# Patient Record
Sex: MALE | Race: OTHER | Employment: OTHER | URBAN - METROPOLITAN AREA
[De-identification: names, ages, dates, MRNs, and addresses within clinical notes are randomized per-mention and may not be internally consistent; named-entity substitution may affect disease eponyms.]

---

## 2018-01-01 ENCOUNTER — APPOINTMENT (OUTPATIENT)
Dept: GENERAL RADIOLOGY | Age: 73
DRG: 308 | End: 2018-01-01
Attending: INTERNAL MEDICINE
Payer: MEDICARE

## 2018-01-01 ENCOUNTER — APPOINTMENT (OUTPATIENT)
Dept: GENERAL RADIOLOGY | Age: 73
DRG: 308 | End: 2018-01-01
Attending: EMERGENCY MEDICINE
Payer: MEDICARE

## 2018-01-01 ENCOUNTER — HOSPITAL ENCOUNTER (INPATIENT)
Age: 73
LOS: 1 days | DRG: 308 | End: 2018-09-19
Attending: EMERGENCY MEDICINE | Admitting: INTERNAL MEDICINE
Payer: MEDICARE

## 2018-01-01 ENCOUNTER — APPOINTMENT (OUTPATIENT)
Dept: ULTRASOUND IMAGING | Age: 73
DRG: 308 | End: 2018-01-01
Attending: INTERNAL MEDICINE
Payer: MEDICARE

## 2018-01-01 VITALS
BODY MASS INDEX: 24.31 KG/M2 | RESPIRATION RATE: 12 BRPM | SYSTOLIC BLOOD PRESSURE: 56 MMHG | HEART RATE: 100 BPM | DIASTOLIC BLOOD PRESSURE: 23 MMHG | OXYGEN SATURATION: 89 % | HEIGHT: 72 IN | TEMPERATURE: 97.7 F | WEIGHT: 179.45 LBS

## 2018-01-01 DIAGNOSIS — N17.9 ACUTE RENAL FAILURE, UNSPECIFIED ACUTE RENAL FAILURE TYPE (HCC): ICD-10-CM

## 2018-01-01 DIAGNOSIS — E87.6 HYPOKALEMIA: ICD-10-CM

## 2018-01-01 DIAGNOSIS — I46.9 CARDIAC ARREST (HCC): Primary | ICD-10-CM

## 2018-01-01 DIAGNOSIS — I47.20 VENTRICULAR TACHYCARDIA: ICD-10-CM

## 2018-01-01 DIAGNOSIS — E83.51 HYPOCALCEMIA: ICD-10-CM

## 2018-01-01 LAB
ALBUMIN SERPL-MCNC: 3.4 G/DL (ref 3.5–5)
ALBUMIN SERPL-MCNC: 3.5 G/DL (ref 3.5–5)
ALBUMIN/GLOB SERPL: 1.3 {RATIO} (ref 1.1–2.2)
ALBUMIN/GLOB SERPL: 1.3 {RATIO} (ref 1.1–2.2)
ALP SERPL-CCNC: 86 U/L (ref 45–117)
ALP SERPL-CCNC: 93 U/L (ref 45–117)
ALT SERPL-CCNC: 23 U/L (ref 12–78)
ALT SERPL-CCNC: 3048 U/L (ref 12–78)
ANION GAP BLD CALC-SCNC: 19 MMOL/L (ref 10–20)
ANION GAP SERPL CALC-SCNC: 14 MMOL/L (ref 5–15)
ANION GAP SERPL CALC-SCNC: 14 MMOL/L (ref 5–15)
ANION GAP SERPL CALC-SCNC: 15 MMOL/L (ref 5–15)
APTT PPP: 25.2 SEC (ref 22.1–32)
ARTERIAL PATENCY WRIST A: YES
AST SERPL-CCNC: 19 U/L (ref 15–37)
AST SERPL-CCNC: >2000 U/L (ref 15–37)
ATRIAL RATE: 110 BPM
ATRIAL RATE: 78 BPM
BASE DEFICIT BLDA-SCNC: 1.1 MMOL/L
BASE DEFICIT BLDA-SCNC: 6.7 MMOL/L
BASE DEFICIT BLDA-SCNC: 7.2 MMOL/L
BASE DEFICIT BLDA-SCNC: 7.6 MMOL/L
BASOPHILS # BLD: 0 K/UL (ref 0–0.1)
BASOPHILS # BLD: 0 K/UL (ref 0–0.1)
BASOPHILS NFR BLD: 0 % (ref 0–1)
BASOPHILS NFR BLD: 0 % (ref 0–1)
BDY SITE: ABNORMAL
BILIRUB SERPL-MCNC: 1.4 MG/DL (ref 0.2–1)
BILIRUB SERPL-MCNC: 3.5 MG/DL (ref 0.2–1)
BREATHS.SPONTANEOUS ON VENT: 16
BREATHS.SPONTANEOUS ON VENT: 24
BUN BLD-MCNC: 58 MG/DL (ref 9–20)
BUN SERPL-MCNC: 64 MG/DL (ref 6–20)
BUN SERPL-MCNC: 67 MG/DL (ref 6–20)
BUN SERPL-MCNC: 70 MG/DL (ref 6–20)
BUN/CREAT SERPL: 22 (ref 12–20)
BUN/CREAT SERPL: 23 (ref 12–20)
BUN/CREAT SERPL: 26 (ref 12–20)
CA-I BLD-MCNC: 1.05 MMOL/L (ref 1.12–1.32)
CALCIUM SERPL-MCNC: 7.5 MG/DL (ref 8.5–10.1)
CALCIUM SERPL-MCNC: 8.5 MG/DL (ref 8.5–10.1)
CALCIUM SERPL-MCNC: 8.7 MG/DL (ref 8.5–10.1)
CALCULATED P AXIS, ECG09: 94 DEGREES
CALCULATED R AXIS, ECG10: -27 DEGREES
CALCULATED R AXIS, ECG10: 32 DEGREES
CALCULATED T AXIS, ECG11: 116 DEGREES
CALCULATED T AXIS, ECG11: 123 DEGREES
CHLORIDE BLD-SCNC: 98 MMOL/L (ref 98–107)
CHLORIDE SERPL-SCNC: 101 MMOL/L (ref 97–108)
CHLORIDE SERPL-SCNC: 101 MMOL/L (ref 97–108)
CHLORIDE SERPL-SCNC: 103 MMOL/L (ref 97–108)
CK MB CFR SERPL CALC: 1.1 % (ref 0–2.5)
CK MB CFR SERPL CALC: 2.3 % (ref 0–2.5)
CK MB CFR SERPL CALC: 3.4 % (ref 0–2.5)
CK MB SERPL-MCNC: 1.2 NG/ML (ref 5–25)
CK MB SERPL-MCNC: 1.3 NG/ML (ref 5–25)
CK MB SERPL-MCNC: 2 NG/ML (ref 5–25)
CK SERPL-CCNC: 107 U/L (ref 39–308)
CK SERPL-CCNC: 57 U/L (ref 39–308)
CK SERPL-CCNC: 59 U/L (ref 39–308)
CO2 BLD-SCNC: 25 MMOL/L (ref 21–32)
CO2 SERPL-SCNC: 21 MMOL/L (ref 21–32)
CO2 SERPL-SCNC: 21 MMOL/L (ref 21–32)
CO2 SERPL-SCNC: 24 MMOL/L (ref 21–32)
CREAT BLD-MCNC: 2.2 MG/DL (ref 0.6–1.3)
CREAT SERPL-MCNC: 2.43 MG/DL (ref 0.7–1.3)
CREAT SERPL-MCNC: 2.94 MG/DL (ref 0.7–1.3)
CREAT SERPL-MCNC: 3.24 MG/DL (ref 0.7–1.3)
DIAGNOSIS, 93000: NORMAL
DIAGNOSIS, 93000: NORMAL
DIFFERENTIAL METHOD BLD: ABNORMAL
DIFFERENTIAL METHOD BLD: ABNORMAL
EOSINOPHIL # BLD: 0 K/UL (ref 0–0.4)
EOSINOPHIL # BLD: 0.1 K/UL (ref 0–0.4)
EOSINOPHIL NFR BLD: 0 % (ref 0–7)
EOSINOPHIL NFR BLD: 3 % (ref 0–7)
EPAP/CPAP/PEEP, PAPEEP: 6
EPAP/CPAP/PEEP, PAPEEP: 7
ERYTHROCYTE [DISTWIDTH] IN BLOOD BY AUTOMATED COUNT: 16.5 % (ref 11.5–14.5)
ERYTHROCYTE [DISTWIDTH] IN BLOOD BY AUTOMATED COUNT: 16.5 % (ref 11.5–14.5)
FIO2 ON VENT: 44 %
FIO2 ON VENT: 50 %
FIO2 ON VENT: 60 %
FOLATE SERPL-MCNC: 48.1 NG/ML (ref 5–21)
GAS FLOW.O2 O2 DELIVERY SYS: 4 L/MIN
GAS FLOW.O2 O2 DELIVERY SYS: 6 L/MIN
GAS FLOW.O2 SETTING OXYMISER: 4 L/MIN
GAS FLOW.O2 SETTING OXYMISER: 4 L/MIN
GLOBULIN SER CALC-MCNC: 2.7 G/DL (ref 2–4)
GLOBULIN SER CALC-MCNC: 2.8 G/DL (ref 2–4)
GLUCOSE BLD-MCNC: 194 MG/DL (ref 65–100)
GLUCOSE SERPL-MCNC: 142 MG/DL (ref 65–100)
GLUCOSE SERPL-MCNC: 144 MG/DL (ref 65–100)
GLUCOSE SERPL-MCNC: 180 MG/DL (ref 65–100)
HAPTOGLOB SERPL-MCNC: 68 MG/DL (ref 30–200)
HCO3 BLDA-SCNC: 16 MMOL/L (ref 22–26)
HCO3 BLDA-SCNC: 17 MMOL/L (ref 22–26)
HCO3 BLDA-SCNC: 19 MMOL/L (ref 22–26)
HCO3 BLDA-SCNC: 24 MMOL/L (ref 22–26)
HCT VFR BLD AUTO: 39.3 % (ref 36.6–50.3)
HCT VFR BLD AUTO: 40.4 % (ref 36.6–50.3)
HCT VFR BLD CALC: 41 % (ref 36.6–50.3)
HGB BLD-MCNC: 12.6 G/DL (ref 12.1–17)
HGB BLD-MCNC: 13 G/DL (ref 12.1–17)
IMM GRANULOCYTES # BLD: 0 K/UL (ref 0–0.04)
IMM GRANULOCYTES # BLD: 0 K/UL (ref 0–0.04)
IMM GRANULOCYTES NFR BLD AUTO: 0 % (ref 0–0.5)
IMM GRANULOCYTES NFR BLD AUTO: 0 % (ref 0–0.5)
INR PPP: 1.3 (ref 0.9–1.1)
IPAP/PIP, IPAPIP: 12
IPAP/PIP, IPAPIP: 14
LDH SERPL L TO P-CCNC: >4000 U/L (ref 85–241)
LYMPHOCYTES # BLD: 0.2 K/UL (ref 0.8–3.5)
LYMPHOCYTES # BLD: 0.5 K/UL (ref 0.8–3.5)
LYMPHOCYTES NFR BLD: 1 % (ref 12–49)
LYMPHOCYTES NFR BLD: 17 % (ref 12–49)
MAGNESIUM SERPL-MCNC: 2.4 MG/DL (ref 1.6–2.4)
MAGNESIUM SERPL-MCNC: 2.4 MG/DL (ref 1.6–2.4)
MCH RBC QN AUTO: 34.4 PG (ref 26–34)
MCH RBC QN AUTO: 34.6 PG (ref 26–34)
MCHC RBC AUTO-ENTMCNC: 32.1 G/DL (ref 30–36.5)
MCHC RBC AUTO-ENTMCNC: 32.2 G/DL (ref 30–36.5)
MCV RBC AUTO: 106.9 FL (ref 80–99)
MCV RBC AUTO: 108 FL (ref 80–99)
METAMYELOCYTES NFR BLD MANUAL: 1 %
MONOCYTES # BLD: 0.3 K/UL (ref 0–1)
MONOCYTES # BLD: 0.4 K/UL (ref 0–1)
MONOCYTES NFR BLD: 15 % (ref 5–13)
MONOCYTES NFR BLD: 2 % (ref 5–13)
NEUTS BAND NFR BLD MANUAL: 4 %
NEUTS BAND NFR BLD MANUAL: 8 %
NEUTS SEG # BLD: 1.7 K/UL (ref 1.8–8)
NEUTS SEG # BLD: 15 K/UL (ref 1.8–8)
NEUTS SEG NFR BLD: 61 % (ref 32–75)
NEUTS SEG NFR BLD: 88 % (ref 32–75)
NRBC # BLD: 0.03 K/UL (ref 0–0.01)
NRBC # BLD: 0.06 K/UL (ref 0–0.01)
NRBC BLD-RTO: 0.4 PER 100 WBC
NRBC BLD-RTO: 1.1 PER 100 WBC
P-R INTERVAL, ECG05: 122 MS
PCO2 BLDA: 29 MMHG (ref 35–45)
PCO2 BLDA: 31 MMHG (ref 35–45)
PCO2 BLDA: 39 MMHG (ref 35–45)
PCO2 BLDA: 40 MMHG (ref 35–45)
PH BLDA: 7.3 [PH] (ref 7.35–7.45)
PH BLDA: 7.36 [PH] (ref 7.35–7.45)
PH BLDA: 7.37 [PH] (ref 7.35–7.45)
PH BLDA: 7.39 [PH] (ref 7.35–7.45)
PHOSPHATE SERPL-MCNC: 4.1 MG/DL (ref 2.6–4.7)
PLATELET # BLD AUTO: 74 K/UL (ref 150–400)
PLATELET # BLD AUTO: 92 K/UL (ref 150–400)
PMV BLD AUTO: 11.3 FL (ref 8.9–12.9)
PMV BLD AUTO: 11.9 FL (ref 8.9–12.9)
PO2 BLDA: 39 MMHG (ref 80–100)
PO2 BLDA: 47 MMHG (ref 80–100)
PO2 BLDA: 50 MMHG (ref 80–100)
PO2 BLDA: 82 MMHG (ref 80–100)
POTASSIUM BLD-SCNC: 2.6 MMOL/L (ref 3.5–5.1)
POTASSIUM SERPL-SCNC: 2.7 MMOL/L (ref 3.5–5.1)
POTASSIUM SERPL-SCNC: 4.1 MMOL/L (ref 3.5–5.1)
POTASSIUM SERPL-SCNC: 4.3 MMOL/L (ref 3.5–5.1)
PROT SERPL-MCNC: 6.1 G/DL (ref 6.4–8.2)
PROT SERPL-MCNC: 6.3 G/DL (ref 6.4–8.2)
PROTHROMBIN TIME: 13 SEC (ref 9–11.1)
Q-T INTERVAL, ECG07: 396 MS
Q-T INTERVAL, ECG07: 456 MS
QRS DURATION, ECG06: 154 MS
QRS DURATION, ECG06: 176 MS
QTC CALCULATION (BEZET), ECG08: 523 MS
QTC CALCULATION (BEZET), ECG08: 594 MS
RBC # BLD AUTO: 3.64 M/UL (ref 4.1–5.7)
RBC # BLD AUTO: 3.78 M/UL (ref 4.1–5.7)
RBC MORPH BLD: ABNORMAL
RETICS # AUTO: 0.23 M/UL (ref 0.03–0.1)
RETICS/RBC NFR AUTO: 6 % (ref 0.7–2.1)
SAO2 % BLD: 68 % (ref 92–97)
SAO2 % BLD: 83 % (ref 92–97)
SAO2 % BLD: 85 % (ref 92–97)
SAO2 % BLD: 96 % (ref 92–97)
SAO2% DEVICE SAO2% SENSOR NAME: ABNORMAL
SERVICE CMNT-IMP: ABNORMAL
SODIUM BLD-SCNC: 138 MMOL/L (ref 136–145)
SODIUM SERPL-SCNC: 137 MMOL/L (ref 136–145)
SODIUM SERPL-SCNC: 138 MMOL/L (ref 136–145)
SODIUM SERPL-SCNC: 139 MMOL/L (ref 136–145)
SPECIMEN SITE: ABNORMAL
THERAPEUTIC RANGE,PTTT: NORMAL SECS (ref 58–77)
TROPONIN I SERPL-MCNC: 0.1 NG/ML
TROPONIN I SERPL-MCNC: 0.17 NG/ML
TROPONIN I SERPL-MCNC: <0.05 NG/ML
TSH SERPL DL<=0.05 MIU/L-ACNC: 1.43 UIU/ML (ref 0.36–3.74)
VENTILATION MODE VENT: ABNORMAL
VENTRICULAR RATE, ECG03: 102 BPM
VENTRICULAR RATE, ECG03: 105 BPM
VIT B12 SERPL-MCNC: >2000 PG/ML (ref 193–986)
WBC # BLD AUTO: 15.6 K/UL (ref 4.1–11.1)
WBC # BLD AUTO: 2.7 K/UL (ref 4.1–11.1)

## 2018-01-01 PROCEDURE — 96375 TX/PRO/DX INJ NEW DRUG ADDON: CPT

## 2018-01-01 PROCEDURE — 82803 BLOOD GASES ANY COMBINATION: CPT | Performed by: INTERNAL MEDICINE

## 2018-01-01 PROCEDURE — 74011636637 HC RX REV CODE- 636/637: Performed by: INTERNAL MEDICINE

## 2018-01-01 PROCEDURE — 85610 PROTHROMBIN TIME: CPT | Performed by: EMERGENCY MEDICINE

## 2018-01-01 PROCEDURE — 83010 ASSAY OF HAPTOGLOBIN QUANT: CPT | Performed by: INTERNAL MEDICINE

## 2018-01-01 PROCEDURE — 99285 EMERGENCY DEPT VISIT HI MDM: CPT

## 2018-01-01 PROCEDURE — C1751 CATH, INF, PER/CENT/MIDLINE: HCPCS

## 2018-01-01 PROCEDURE — 74011250636 HC RX REV CODE- 250/636: Performed by: INTERNAL MEDICINE

## 2018-01-01 PROCEDURE — 36415 COLL VENOUS BLD VENIPUNCTURE: CPT | Performed by: INTERNAL MEDICINE

## 2018-01-01 PROCEDURE — 84443 ASSAY THYROID STIM HORMONE: CPT | Performed by: INTERNAL MEDICINE

## 2018-01-01 PROCEDURE — 74011250637 HC RX REV CODE- 250/637: Performed by: EMERGENCY MEDICINE

## 2018-01-01 PROCEDURE — 93306 TTE W/DOPPLER COMPLETE: CPT

## 2018-01-01 PROCEDURE — 80047 BASIC METABLC PNL IONIZED CA: CPT

## 2018-01-01 PROCEDURE — 96376 TX/PRO/DX INJ SAME DRUG ADON: CPT

## 2018-01-01 PROCEDURE — 94660 CPAP INITIATION&MGMT: CPT

## 2018-01-01 PROCEDURE — 93005 ELECTROCARDIOGRAM TRACING: CPT

## 2018-01-01 PROCEDURE — 65620000000 HC RM CCU GENERAL

## 2018-01-01 PROCEDURE — 76450000000

## 2018-01-01 PROCEDURE — 0BH17EZ INSERTION OF ENDOTRACHEAL AIRWAY INTO TRACHEA, VIA NATURAL OR ARTIFICIAL OPENING: ICD-10-PCS | Performed by: ANESTHESIOLOGY

## 2018-01-01 PROCEDURE — 82550 ASSAY OF CK (CPK): CPT | Performed by: EMERGENCY MEDICINE

## 2018-01-01 PROCEDURE — 82607 VITAMIN B-12: CPT | Performed by: INTERNAL MEDICINE

## 2018-01-01 PROCEDURE — 71045 X-RAY EXAM CHEST 1 VIEW: CPT

## 2018-01-01 PROCEDURE — 74011000250 HC RX REV CODE- 250: Performed by: INTERNAL MEDICINE

## 2018-01-01 PROCEDURE — 77030018786 HC NDL GD F/USND BARD -B

## 2018-01-01 PROCEDURE — 74011250636 HC RX REV CODE- 250/636: Performed by: EMERGENCY MEDICINE

## 2018-01-01 PROCEDURE — 85025 COMPLETE CBC W/AUTO DIFF WBC: CPT | Performed by: EMERGENCY MEDICINE

## 2018-01-01 PROCEDURE — 84484 ASSAY OF TROPONIN QUANT: CPT | Performed by: INTERNAL MEDICINE

## 2018-01-01 PROCEDURE — 36600 WITHDRAWAL OF ARTERIAL BLOOD: CPT | Performed by: EMERGENCY MEDICINE

## 2018-01-01 PROCEDURE — 94640 AIRWAY INHALATION TREATMENT: CPT

## 2018-01-01 PROCEDURE — 02HV33Z INSERTION OF INFUSION DEVICE INTO SUPERIOR VENA CAVA, PERCUTANEOUS APPROACH: ICD-10-PCS | Performed by: EMERGENCY MEDICINE

## 2018-01-01 PROCEDURE — 5A09357 ASSISTANCE WITH RESPIRATORY VENTILATION, LESS THAN 24 CONSECUTIVE HOURS, CONTINUOUS POSITIVE AIRWAY PRESSURE: ICD-10-PCS | Performed by: INTERNAL MEDICINE

## 2018-01-01 PROCEDURE — 82550 ASSAY OF CK (CPK): CPT | Performed by: INTERNAL MEDICINE

## 2018-01-01 PROCEDURE — 76937 US GUIDE VASCULAR ACCESS: CPT

## 2018-01-01 PROCEDURE — 92950 HEART/LUNG RESUSCITATION CPR: CPT

## 2018-01-01 PROCEDURE — 36600 WITHDRAWAL OF ARTERIAL BLOOD: CPT | Performed by: INTERNAL MEDICINE

## 2018-01-01 PROCEDURE — 36569 INSJ PICC 5 YR+ W/O IMAGING: CPT | Performed by: INTERNAL MEDICINE

## 2018-01-01 PROCEDURE — 74011250637 HC RX REV CODE- 250/637: Performed by: INTERNAL MEDICINE

## 2018-01-01 PROCEDURE — 80053 COMPREHEN METABOLIC PANEL: CPT | Performed by: INTERNAL MEDICINE

## 2018-01-01 PROCEDURE — 85025 COMPLETE CBC W/AUTO DIFF WBC: CPT | Performed by: INTERNAL MEDICINE

## 2018-01-01 PROCEDURE — 82803 BLOOD GASES ANY COMBINATION: CPT | Performed by: EMERGENCY MEDICINE

## 2018-01-01 PROCEDURE — 75810000137 HC PLCMT CENT VENOUS CATH

## 2018-01-01 PROCEDURE — 83615 LACTATE (LD) (LDH) ENZYME: CPT | Performed by: INTERNAL MEDICINE

## 2018-01-01 PROCEDURE — 74011000258 HC RX REV CODE- 258: Performed by: EMERGENCY MEDICINE

## 2018-01-01 PROCEDURE — 85730 THROMBOPLASTIN TIME PARTIAL: CPT | Performed by: EMERGENCY MEDICINE

## 2018-01-01 PROCEDURE — 77030013033 HC MSK BPAP/CPAP MMKA -B

## 2018-01-01 PROCEDURE — 83735 ASSAY OF MAGNESIUM: CPT | Performed by: EMERGENCY MEDICINE

## 2018-01-01 PROCEDURE — 82746 ASSAY OF FOLIC ACID SERUM: CPT | Performed by: INTERNAL MEDICINE

## 2018-01-01 PROCEDURE — 83735 ASSAY OF MAGNESIUM: CPT | Performed by: INTERNAL MEDICINE

## 2018-01-01 PROCEDURE — 84100 ASSAY OF PHOSPHORUS: CPT | Performed by: INTERNAL MEDICINE

## 2018-01-01 PROCEDURE — 80053 COMPREHEN METABOLIC PANEL: CPT | Performed by: EMERGENCY MEDICINE

## 2018-01-01 PROCEDURE — 84484 ASSAY OF TROPONIN QUANT: CPT | Performed by: EMERGENCY MEDICINE

## 2018-01-01 PROCEDURE — 96365 THER/PROPH/DIAG IV INF INIT: CPT

## 2018-01-01 PROCEDURE — 85045 AUTOMATED RETICULOCYTE COUNT: CPT | Performed by: INTERNAL MEDICINE

## 2018-01-01 RX ORDER — ENOXAPARIN SODIUM 100 MG/ML
30 INJECTION SUBCUTANEOUS EVERY 24 HOURS
Status: DISCONTINUED | OUTPATIENT
Start: 2018-01-01 | End: 2018-01-01

## 2018-01-01 RX ORDER — EPINEPHRINE 0.1 MG/ML
INJECTION INTRACARDIAC; INTRAVENOUS
Status: COMPLETED | OUTPATIENT
Start: 2018-01-01 | End: 2018-01-01

## 2018-01-01 RX ORDER — FUROSEMIDE 40 MG/1
40 TABLET ORAL DAILY
COMMUNITY

## 2018-01-01 RX ORDER — BACITRACIN 500 UNIT/G
1 PACKET (EA) TOPICAL AS NEEDED
Status: DISCONTINUED | OUTPATIENT
Start: 2018-01-01 | End: 2018-01-01 | Stop reason: HOSPADM

## 2018-01-01 RX ORDER — SODIUM CHLORIDE 0.9 % (FLUSH) 0.9 %
10-40 SYRINGE (ML) INJECTION EVERY 8 HOURS
Status: DISCONTINUED | OUTPATIENT
Start: 2018-01-01 | End: 2018-01-01 | Stop reason: HOSPADM

## 2018-01-01 RX ORDER — ENALAPRIL MALEATE 5 MG/1
2.5 TABLET ORAL DAILY
COMMUNITY

## 2018-01-01 RX ORDER — LIDOCAINE HCL/PF 100 MG/5ML
SYRINGE (ML) INTRAVENOUS
Status: COMPLETED | OUTPATIENT
Start: 2018-01-01 | End: 2018-01-01

## 2018-01-01 RX ORDER — ASPIRIN 325 MG
325 TABLET ORAL DAILY
Status: DISCONTINUED | OUTPATIENT
Start: 2018-01-01 | End: 2018-01-01 | Stop reason: HOSPADM

## 2018-01-01 RX ORDER — POTASSIUM CHLORIDE 29.8 MG/ML
20 INJECTION INTRAVENOUS
Status: COMPLETED | OUTPATIENT
Start: 2018-01-01 | End: 2018-01-01

## 2018-01-01 RX ORDER — PREDNISONE 20 MG/1
20 TABLET ORAL
Status: DISCONTINUED | OUTPATIENT
Start: 2018-01-01 | End: 2018-01-01 | Stop reason: HOSPADM

## 2018-01-01 RX ORDER — SODIUM CHLORIDE 0.9 % (FLUSH) 0.9 %
10-30 SYRINGE (ML) INJECTION AS NEEDED
Status: DISCONTINUED | OUTPATIENT
Start: 2018-01-01 | End: 2018-01-01 | Stop reason: HOSPADM

## 2018-01-01 RX ORDER — HEPARIN 100 UNIT/ML
300 SYRINGE INTRAVENOUS AS NEEDED
Status: DISCONTINUED | OUTPATIENT
Start: 2018-01-01 | End: 2018-01-01 | Stop reason: HOSPADM

## 2018-01-01 RX ORDER — SODIUM CHLORIDE 0.9 % (FLUSH) 0.9 %
5-10 SYRINGE (ML) INJECTION AS NEEDED
Status: DISCONTINUED | OUTPATIENT
Start: 2018-01-01 | End: 2018-01-01 | Stop reason: HOSPADM

## 2018-01-01 RX ORDER — SODIUM CHLORIDE 0.9 % (FLUSH) 0.9 %
5-10 SYRINGE (ML) INJECTION EVERY 8 HOURS
Status: DISCONTINUED | OUTPATIENT
Start: 2018-01-01 | End: 2018-01-01 | Stop reason: HOSPADM

## 2018-01-01 RX ORDER — CALCIUM GLUCONATE 94 MG/ML
1 INJECTION, SOLUTION INTRAVENOUS
Status: COMPLETED | OUTPATIENT
Start: 2018-01-01 | End: 2018-01-01

## 2018-01-01 RX ORDER — FUROSEMIDE 10 MG/ML
80 INJECTION INTRAMUSCULAR; INTRAVENOUS 2 TIMES DAILY
Status: DISCONTINUED | OUTPATIENT
Start: 2018-01-01 | End: 2018-01-01 | Stop reason: HOSPADM

## 2018-01-01 RX ORDER — IPRATROPIUM BROMIDE AND ALBUTEROL SULFATE 2.5; .5 MG/3ML; MG/3ML
3 SOLUTION RESPIRATORY (INHALATION)
Status: DISCONTINUED | OUTPATIENT
Start: 2018-01-01 | End: 2018-01-01 | Stop reason: HOSPADM

## 2018-01-01 RX ORDER — POTASSIUM CHLORIDE 29.8 MG/ML
20 INJECTION INTRAVENOUS
Status: DISCONTINUED | OUTPATIENT
Start: 2018-01-01 | End: 2018-01-01

## 2018-01-01 RX ORDER — FACIAL-BODY WIPES
10 EACH TOPICAL DAILY PRN
Status: DISCONTINUED | OUTPATIENT
Start: 2018-01-01 | End: 2018-01-01 | Stop reason: HOSPADM

## 2018-01-01 RX ORDER — DOPAMINE HYDROCHLORIDE 320 MG/100ML
0-20 INJECTION, SOLUTION INTRAVENOUS
Status: DISCONTINUED | OUTPATIENT
Start: 2018-01-01 | End: 2018-01-01 | Stop reason: HOSPADM

## 2018-01-01 RX ORDER — ACETAMINOPHEN 325 MG/1
650 TABLET ORAL
Status: DISCONTINUED | OUTPATIENT
Start: 2018-01-01 | End: 2018-01-01 | Stop reason: HOSPADM

## 2018-01-01 RX ORDER — MUPIROCIN 20 MG/G
OINTMENT TOPICAL 2 TIMES DAILY
Status: DISCONTINUED | OUTPATIENT
Start: 2018-01-01 | End: 2018-01-01 | Stop reason: HOSPADM

## 2018-01-01 RX ORDER — SODIUM BICARBONATE 1 MEQ/ML
SYRINGE (ML) INTRAVENOUS
Status: COMPLETED | OUTPATIENT
Start: 2018-01-01 | End: 2018-01-01

## 2018-01-01 RX ORDER — NALOXONE HYDROCHLORIDE 0.4 MG/ML
0.4 INJECTION, SOLUTION INTRAMUSCULAR; INTRAVENOUS; SUBCUTANEOUS AS NEEDED
Status: DISCONTINUED | OUTPATIENT
Start: 2018-01-01 | End: 2018-01-01 | Stop reason: HOSPADM

## 2018-01-01 RX ORDER — LEVOTHYROXINE SODIUM 125 UG/1
125 TABLET ORAL
COMMUNITY

## 2018-01-01 RX ORDER — AA/PROT/LYSINE/METHIO/VIT C/B6 50-12.5 MG
1 TABLET ORAL DAILY
COMMUNITY

## 2018-01-01 RX ORDER — FAMOTIDINE 20 MG/1
40 TABLET, FILM COATED ORAL
COMMUNITY

## 2018-01-01 RX ORDER — SODIUM CHLORIDE 0.9 % (FLUSH) 0.9 %
10 SYRINGE (ML) INJECTION EVERY 24 HOURS
Status: DISCONTINUED | OUTPATIENT
Start: 2018-01-01 | End: 2018-01-01 | Stop reason: HOSPADM

## 2018-01-01 RX ORDER — CARVEDILOL 25 MG/1
12.5 TABLET ORAL 2 TIMES DAILY WITH MEALS
COMMUNITY

## 2018-01-01 RX ORDER — PANTOPRAZOLE SODIUM 40 MG/1
40 TABLET, DELAYED RELEASE ORAL DAILY
Status: DISCONTINUED | OUTPATIENT
Start: 2018-01-01 | End: 2018-01-01 | Stop reason: HOSPADM

## 2018-01-01 RX ORDER — LEVOTHYROXINE SODIUM 125 UG/1
125 TABLET ORAL
Status: DISCONTINUED | OUTPATIENT
Start: 2018-01-01 | End: 2018-01-01 | Stop reason: HOSPADM

## 2018-01-01 RX ORDER — ONDANSETRON 2 MG/ML
4 INJECTION INTRAMUSCULAR; INTRAVENOUS
Status: DISCONTINUED | OUTPATIENT
Start: 2018-01-01 | End: 2018-01-01 | Stop reason: HOSPADM

## 2018-01-01 RX ORDER — AMIODARONE HYDROCHLORIDE 200 MG/1
200 TABLET ORAL DAILY
COMMUNITY

## 2018-01-01 RX ORDER — ASPIRIN 81 MG/1
81 TABLET ORAL DAILY
COMMUNITY

## 2018-01-01 RX ORDER — PANTOPRAZOLE SODIUM 40 MG/1
40 TABLET, DELAYED RELEASE ORAL DAILY
COMMUNITY

## 2018-01-01 RX ADMIN — EPINEPHRINE 1 MG: 0.1 INJECTION, SOLUTION ENDOTRACHEAL; INTRACARDIAC; INTRAVENOUS at 11:26

## 2018-01-01 RX ADMIN — PREDNISONE 20 MG: 20 TABLET ORAL at 10:06

## 2018-01-01 RX ADMIN — IPRATROPIUM BROMIDE AND ALBUTEROL SULFATE 3 ML: .5; 3 SOLUTION RESPIRATORY (INHALATION) at 19:14

## 2018-01-01 RX ADMIN — ACETAMINOPHEN 650 MG: 325 TABLET ORAL at 22:01

## 2018-01-01 RX ADMIN — PHENYLEPHRINE HYDROCHLORIDE 90 MCG/MIN: 10 INJECTION INTRAVENOUS at 06:30

## 2018-01-01 RX ADMIN — EPINEPHRINE 1 MG: 0.1 INJECTION, SOLUTION ENDOTRACHEAL; INTRACARDIAC; INTRAVENOUS at 11:34

## 2018-01-01 RX ADMIN — PHENYLEPHRINE HYDROCHLORIDE 100 MCG/MIN: 10 INJECTION INTRAVENOUS at 21:00

## 2018-01-01 RX ADMIN — ASPIRIN 325 MG: 325 TABLET ORAL at 08:34

## 2018-01-01 RX ADMIN — MUPIROCIN: 20 OINTMENT TOPICAL at 22:01

## 2018-01-01 RX ADMIN — DOPAMINE HYDROCHLORIDE IN DEXTROSE 5 MCG/KG/MIN: 3.2 INJECTION, SOLUTION INTRAVENOUS at 10:22

## 2018-01-01 RX ADMIN — MUPIROCIN: 20 OINTMENT TOPICAL at 08:44

## 2018-01-01 RX ADMIN — IPRATROPIUM BROMIDE AND ALBUTEROL SULFATE 3 ML: .5; 3 SOLUTION RESPIRATORY (INHALATION) at 07:32

## 2018-01-01 RX ADMIN — ONDANSETRON 4 MG: 2 INJECTION INTRAMUSCULAR; INTRAVENOUS at 11:03

## 2018-01-01 RX ADMIN — POTASSIUM CHLORIDE 20 MEQ: 400 INJECTION, SOLUTION INTRAVENOUS at 15:43

## 2018-01-01 RX ADMIN — METHYLPREDNISOLONE SODIUM SUCCINATE 80 MG: 40 INJECTION, POWDER, FOR SOLUTION INTRAMUSCULAR; INTRAVENOUS at 05:50

## 2018-01-01 RX ADMIN — Medication 10 ML: at 22:00

## 2018-01-01 RX ADMIN — METHYLPREDNISOLONE SODIUM SUCCINATE 80 MG: 40 INJECTION, POWDER, FOR SOLUTION INTRAMUSCULAR; INTRAVENOUS at 20:20

## 2018-01-01 RX ADMIN — POTASSIUM CHLORIDE 20 MEQ: 400 INJECTION, SOLUTION INTRAVENOUS at 20:17

## 2018-01-01 RX ADMIN — ACETAMINOPHEN 650 MG: 325 TABLET ORAL at 08:34

## 2018-01-01 RX ADMIN — EPINEPHRINE 1 MG: 0.1 INJECTION, SOLUTION ENDOTRACHEAL; INTRACARDIAC; INTRAVENOUS at 11:30

## 2018-01-01 RX ADMIN — ONDANSETRON 4 MG: 2 INJECTION INTRAMUSCULAR; INTRAVENOUS at 17:47

## 2018-01-01 RX ADMIN — CALCIUM GLUCONATE 1 G: 98 INJECTION, SOLUTION INTRAVENOUS at 15:23

## 2018-01-01 RX ADMIN — Medication 10 ML: at 05:59

## 2018-01-01 RX ADMIN — FUROSEMIDE 80 MG: 10 INJECTION, SOLUTION INTRAMUSCULAR; INTRAVENOUS at 19:45

## 2018-01-01 RX ADMIN — PHENYLEPHRINE HYDROCHLORIDE 10 MCG/MIN: 10 INJECTION INTRAVENOUS at 15:39

## 2018-01-01 RX ADMIN — AMIODARONE HYDROCHLORIDE 1 MG/MIN: 50 INJECTION, SOLUTION INTRAVENOUS at 15:14

## 2018-01-01 RX ADMIN — PHENYLEPHRINE HYDROCHLORIDE 70 MCG/MIN: 10 INJECTION INTRAVENOUS at 20:29

## 2018-01-01 RX ADMIN — POTASSIUM CHLORIDE 20 MEQ: 400 INJECTION, SOLUTION INTRAVENOUS at 18:43

## 2018-01-01 RX ADMIN — PANTOPRAZOLE SODIUM 40 MG: 40 TABLET, DELAYED RELEASE ORAL at 08:34

## 2018-01-01 RX ADMIN — LIDOCAINE HYDROCHLORIDE 81.4 MG: 20 INJECTION, SOLUTION INTRAVENOUS at 11:31

## 2018-01-01 RX ADMIN — Medication 10 ML: at 16:22

## 2018-01-01 RX ADMIN — ENOXAPARIN SODIUM 30 MG: 30 INJECTION, SOLUTION INTRAVENOUS; SUBCUTANEOUS at 08:46

## 2018-01-01 RX ADMIN — POTASSIUM CHLORIDE 20 MEQ: 400 INJECTION, SOLUTION INTRAVENOUS at 22:00

## 2018-01-01 RX ADMIN — Medication 10 ML: at 19:49

## 2018-01-01 RX ADMIN — LEVOTHYROXINE SODIUM 125 MCG: 125 TABLET ORAL at 09:28

## 2018-01-01 RX ADMIN — SODIUM BICARBONATE 50 MEQ: 84 INJECTION, SOLUTION INTRAVENOUS at 11:27

## 2018-01-01 RX ADMIN — FUROSEMIDE 80 MG: 10 INJECTION, SOLUTION INTRAMUSCULAR; INTRAVENOUS at 08:50

## 2018-01-01 RX ADMIN — EPINEPHRINE 1 MG: 0.1 INJECTION, SOLUTION ENDOTRACHEAL; INTRACARDIAC; INTRAVENOUS at 11:22

## 2018-01-01 RX ADMIN — AMIODARONE HYDROCHLORIDE 150 MG: 50 INJECTION, SOLUTION INTRAVENOUS at 13:47

## 2018-09-18 PROBLEM — I46.9 CARDIAC ARREST (HCC): Status: ACTIVE | Noted: 2018-01-01

## 2018-09-18 NOTE — ED NOTES
Dr. Amy Ruiz at bedside talking to the family. Patient was in the hospital on Friday for fluid overload and went to PCP on this morning and nothing was done.

## 2018-09-18 NOTE — ED NOTES
TRANSFER - OUT REPORT: 
 
Verbal and Bedside report given to [facility] RN(name) on Bishnu Martinez  being transferred to CCU(unit) for routine progression of care Report consisted of patients Situation, Background, Assessment and  
Recommendations(SBAR). Information from the following report(s) SBAR, Kardex, ED Summary, Intake/Output, MAR, Recent Results and Med Rec Status was reviewed with the receiving nurse. Lines:  
Triple Lumen 09/18/18 Right Femoral (Active) Central Line Being Utilized Yes 9/18/2018  3:55 PM  
Site Assessment Clean, dry, & intact 9/18/2018  3:55 PM  
   
Peripheral IV 09/18/18 Left Antecubital (Active) Site Assessment Clean, dry, & intact 9/18/2018  3:54 PM  
Phlebitis Assessment 0 9/18/2018  3:54 PM  
Infiltration Assessment 0 9/18/2018  3:54 PM  
Dressing Status Clean, dry, & intact 9/18/2018  3:54 PM  
Dressing Type Transparent 9/18/2018  3:54 PM  
Hub Color/Line Status Green 9/18/2018  3:54 PM  
   
Peripheral IV 09/18/18 Right Antecubital (Active) Site Assessment Clean, dry, & intact 9/18/2018  3:54 PM  
Phlebitis Assessment 0 9/18/2018  3:54 PM  
Infiltration Assessment 0 9/18/2018  3:54 PM  
Dressing Status Clean, dry, & intact 9/18/2018  3:54 PM  
Dressing Type Transparent 9/18/2018  3:54 PM  
Hub Color/Line Status Pink 9/18/2018  3:54 PM  
  
 
Opportunity for questions and clarification was provided. Patient transported with: 
 Registered Nurse Betty Bello

## 2018-09-18 NOTE — ED NOTES
Received results from [facility]. Pt. PO2 49.5 at this time. Paged on call intensivist.  Eduardo Cole call back.

## 2018-09-18 NOTE — ED NOTES
This RN has been unable to get an oxygen saturation reading ever since patient was decreased to 50 percent on BiPap. Dr. Chris Dupree is aware however patient will not go on BiPap due to vomiting and this RN does not feel comfortable with the patient actively vomiting for the increased risk aspiration due to vomiting into the BiPap

## 2018-09-18 NOTE — ED NOTES
Respiratory informed this RN that patients O2 was 50 percent and patient was placed back on BiPap at 100 PERCENT.

## 2018-09-18 NOTE — H&P
Hospitalist Admission Note NAME:  Marcos Espinal :   1945 MRN:   [id number] Date of admit: 2018 PCP: Maximilian Ribeiro NP Assessment/Plan:  
 
Cardiogenic shock POA with hypotension in ED and known LVEF 20%. Admit to ICU.   
Hold all blood medications, hold diuretics till blood pressure stable.   
IV Kieran-Synephrine per cardiology recommendation.   
Check serial cardiac enzymes.   
Check echocardiogram.   
Cardiology and critical care team will be following, I discussed the case with the ICU team this afternoon. Ventricular tachycardia cardiac arrest POA status post AICD firing and brief CPR while in ambulance.   
Had syncopal episode at home that he gradually woke up from, then the arrst in the ambulance Cardiology interrogate the pacer   
Significant hypokalemia of 2.7, currently taking amiodarone, known low EF.  
 Will continue on amiodarone drip. Correct electrolytes.   
Cardiology is following. Acute respiratory failure with hypoxia POA due to decompensated CHF.   
Currently on BiPAP. PCO2 39 on 4 nasal cannula on ABG.   
Chest x-ray with mild to moderate pulmonary edema.   
We will wean BiPAP as tolerated, I asked resp to reduce FiO2 
IV Lasix, increase pressors as needed, d/w ICU team 
Check echocardiogram 
 
severe hypokalemia 2.7 POA received 20 mEq IV potassium in the ED Hypocalcemia POA 
will give additional 20 mEq x 3 as will be receiving IV lasix for the pulm edema Stage III chronic kidney disease POA  
baseline creatinine unclear  
current creatinine is stable compared to several days ago Will hold enalapril, Coreg Check renal ultrasound, try to get old labs COPD POA Not wheezing.   
We will use as needed nebs 15 pound unexplained weight loss over several months POA Early satiety POA Intermittent upper quadrant abdominal pain POA going on for months Recent evaluation by gastroenterologist in [address] [facility] negative per family report Macrocytic anemia POA Pancytopenia with leukopenia and thrombocytopenia POA 
etiology unclear concern for underlying bone marrow disorder, liver disease or myelodysplasia or Gi process ? Cardiac cachexia Check B12, folate, TSH Check retic count, LDH, haptoglobin Normal coags Check renal US to assess liver and spleen Will need more extensive w/u once the acute issues are resolved 
] History of PAF POA Amiodarone Tele ASA Hyperlipidemia POA coronary disease to status post PCI 1996 Body mass index is 24.34 kg/(m^2). Given the patient's current clinical presentation, I have a high level of concern for decompensation if discharged from the emergency department. My assessment of this patient's clinical condition and my plan of care is as noted above. DVT prophylaxis with lovenox Code status: full code NOK: wife History Chief complaint: \"I passed out at home today\" with subsequent V. tach arrest with AICD firing and CPR while in the ambulance History of present illness: 
 29-year-old white male with known systolic CHF LVEF 21%, status post AICD placement, coronary artery disease.  He says for the past 2 weeks he has been very \"exhausted\" and \"dizzy especially when he stands\". He was seen at [facility] ER on 9/14/2018 for these symptoms. At that time his white blood cell count 4.8,  platelets 91, potassium 3.1, troponin 0.05, BUN and creatinine 76 and 2.65(we have no old labs). chest x-ray had mild pulmonary edema, EKG had left bundle branch block, head CT scan was negative per records he had.  Family says his Coreg and enalapril were reduced in half and the wife reduced the Lasix from 80 mg to 40 mg a day on her own.   
He says he was feeling better until today when he got up to walk in the kitchen and he suddenly passed out. [facility] fell to the ground and hit the back of his head.  He was out for 4 minutes per his family and then gradually began to wake up. He denied shortness of breath or chest pain at the time. The family called EMS.   
 
When EMS arrived, he was placed in the ambulance and subsequently went unresponsive and his AICD fired and he got CPR for about 1 minute He then had return of spontaneous circulation, but he was hypotensive and hypoxic He gradually began to wake up In the emergency room, the patient was placed on BiPAP  
he gradually began to wake up and was oriented x 3 when I saw him, following commands He was hypoxic with PaO2 39 on 4 liters and BIPAP was started 
his only complaint when I saw him was wearing the BiPAP mask, he was currently on 100% oxygen with sats of 94% He was placed on Kieran-Synephrine for hypotension per cardiology recommendations.   
He was placed on amiodarone drip. Central line placed in the femoral right femoral vein.   
He notes a recent 15 pound weight loss unexplained over months, positive early satiety No fevers or chills No cough, headaches, sore throat, CP or LE edema He has had intermittent upper quadrant abdominal pain for weeks to months that was evaluated by gastroenterologist in [address] [place] [address] and remains unexplained. His potassium was 2.7 and he received IV potassium runs as well as IV calcium 
we will call to admit the patient Cardiology and ICU team saw pt in the ED, I discussed the case with Dr Mat Way [facility]: 
1. Chronic systolic CHF LVEF 31% 2. CAD s/p left Circ PCI  3. Paroxysmal atrial fib 4. Neck cancer s/p XRT 5. Severe mitral regurg 6. Hyperlipidemia 7. COPD 
8. S/P AICD 9. Hypothyroidism on replacement Social History Substance Use Topics  Smoking status: 1/2 PPD  Smokeless tobacco: Never Used  Alcohol use No  
 lives with wife, full code Family history: 
1 son healthy Father  CAD in 76s Mother  in mid 80s, old age No strokes or cancers No Known Allergies Prior to Admission medications Medication Sig Start Date End Date Taking? Authorizing Provider  
famotidine (PEPCID) 20 mg tablet Take 40 mg by mouth nightly. Sonja Preston MD  
levothyroxine (SYNTHROID) 125 mcg tablet Take 125 mcg by mouth Daily (before breakfast). Sonja Preston MD  
furosemide (LASIX) 40 mg tablet Take 40 mg by mouth daily. Sonja Preston MD  
pantoprazole (PROTONIX) 40 mg tablet Take 40 mg by mouth daily. Yes Phys Other, MD  
carvedilol (COREG) 25 mg tablet Take 12.5 mg by mouth two (2) times daily (with meals). Yes Phys Other, MD  
enalapril (VASOTEC) 5 mg tablet Take 2.5 mg by mouth daily. Sonja Preston MD  
amiodarone (CORDARONE) 200 mg tablet Take 200 mg by mouth daily. Sonja Preston MD  
aspirin delayed-release 81 mg tablet Take 81 mg by mouth daily. Sonja Preston MD  
CYANOCOBALAMIN, VITAMIN B-12, PO Take 1 Tab by mouth daily. Sonja Preston MD  
coenzyme q10 (CO Q-10) 10 mg cap Take 1 Tab by mouth daily. Sonja Preston MD  
prasterone, DHEA, (DHEA PO) Take 1 Tab by mouth daily. Yes Phys Other, MD  
TURMERIC ROOT EXTRACT PO Take 1 Tab by mouth daily. Sonja Preston MD  
docosahexanoic acid/epa (FISH OIL PO) Take 1 Cap by mouth daily. Sonja Preston MD  
aspirin/acetaminophen/caffeine (EXCEDRIN EXTRA STRENGTH PO) Take 1 Tab by mouth two (2) times daily as needed for Pain. Yes Agnieszka Preston MD  
 
 
Review of symptoms: 
   POSITIVE= Bold  Negative = not bold General:  fever, chills, sweats, generalized weakness, weight loss 15 lbs 
   loss of appetite Early satiety Eyes:    blurred vision, eye pain, double vision ENT:    Coryza, sore throat, trouble swallowing Respiratory:   cough, sputum, SOB Cardiology:   chest pain, orthopnea, PND, edema Gastrointestinal:  Chronic abdominal pain , N/V, diarrhea, constipation, melena or BRBPR Genitourinary:  Urgency, dysuria, hematuria Muskuloskeletal :  Joint redness, swelling or acute joint pain, myalgias Hematology:  easy bruising, nose or gum bleeding Dermatological: rash, ulceration Endocrine:   Polyuria or polydipsia, heat or hold intolerance Neurological:  Headache, focal motor or sensory changes Speech difficulties, memory loss Psychological: depression, agitation Objective: VITALS:   
Patient Vitals for the past 24 hrs: 
 Temp Pulse Resp BP SpO2  
18 1600 - 92 20 (!) 83/59 97 % 18 1545 - 90 20 94/70 -  
18 1539 (!) 93.8 °F (34.3 °C) 91 20 114/61 100 % 18 1530 - 90 19 114/61 95 % 18 1500 - 84 19 (!) 86/65 96 % 18 1430 - 97 18 (!) 89/66 99 % 18 1406 - - - - 99 % 18 1400 - 100 25 (!) 89/70 -  
18 1343 - (!) 102 23 95/53 -  
18 1342 - (!) 112 30 95/53 - Temp (24hrs), Av.8 °F (34.3 °C), Min:93.8 °F (34.3 °C), Max:93.8 °F (34.3 °C) O2 Device: BIPAP Wt Readings from Last 12 Encounters:  
18 81.4 kg (179 lb 7.3 oz) PHYSICAL EXAM:  
General:    Alert, cooperative in no distress currently on BIPAP HEENT: Normocephalic, atraumatic    PERRL, EOMI  Sclera no icterus Nasal mucosa without masses or discharge  Hearing intact to voice BIPAP mask in place Neck:  No meningismus, trachea midline, no carotid bruits Thyroid not enlarged, no nodules or tenderness Lungs:   Decreased BS bilaterally. Few bibasilar crackles No wheezing No accessory muscle use or retractions. Heart:   Regular rate and rhythm,  no murmur or gallop. No LE edema Abdomen:   Soft, non-tender. Not distended. Bowel sounds normal.  
  No masses, No Hepatosplenomegaly, No Rebound or guarding Lymph nodes: No cervical or inguinal SCOTT Musculoskeletal:  No Joint swelling, erythema, warmth. No Cyanosis or clubbing Skin:      No rashes Not Jaundiced   No nodules or thickening Neurologic: Alert and oriented X 3, follows commands Cranial nerves 2 to 12 intact Symmetric motor strength bilaterally LAB DATA REVIEWED:   
Recent Results (from the past 12 hour(s)) EKG, 12 LEAD, INITIAL Collection Time: 09/18/18  1:40 PM  
Result Value Ref Range Ventricular Rate 105 BPM  
 Atrial Rate 110 BPM  
 P-R Interval 122 ms QRS Duration 154 ms Q-T Interval 396 ms QTC Calculation (Bezet) 523 ms Calculated P Axis 94 degrees Calculated R Axis 32 degrees Calculated T Axis 123 degrees Diagnosis Atrial flutter Possible Left atrial enlargement Left bundle branch block ST depression, consider subendocardial injury Cannot entirely exclude Inferior infarct No previous ECGs available Confirmed by Mary Negrete MD, Tootie Certain ([id number]) on 9/18/2018 4:55:38 PM 
  
BLOOD GAS, ARTERIAL Collection Time: 09/18/18  1:42 PM  
Result Value Ref Range pH 7.30 (L) 7.35 - 7.45    
 PCO2 39 35.0 - 45.0 mmHg PO2 39 (LL) 80 - 100 mmHg O2 SAT 68 (L) 92 - 97 % BICARBONATE 19 (L) 22 - 26 mmol/L  
 BASE DEFICIT 7.2 mmol/L  
 O2 METHOD NASAL O2    
 O2 FLOW RATE 4.00 L/min Sample source ARTERIAL    
 SITE LEFT RADIAL DAVEY'S TEST YES Critical value read back Camille Zepeda MD   
PROTHROMBIN TIME + INR Collection Time: 09/18/18  1:48 PM  
Result Value Ref Range INR 1.3 (H) 0.9 - 1.1 Prothrombin time 13.0 (H) 9.0 - 11.1 sec PTT Collection Time: 09/18/18  1:48 PM  
Result Value Ref Range aPTT 25.2 22.1 - 32.0 sec  
 aPTT, therapeutic range     58.0 - 77.0 SECS  
CBC WITH AUTOMATED DIFF Collection Time: 09/18/18  1:48 PM  
Result Value Ref Range WBC 2.7 (L) 4.1 - 11.1 K/uL  
 RBC 3.64 (L) 4.10 - 5.70 M/uL  
 HGB 12.6 12.1 - 17.0 g/dL HCT 39.3 36.6 - 50.3 % .0 (H) 80.0 - 99.0 FL  
 MCH 34.6 (H) 26.0 - 34.0 PG  
 MCHC 32.1 30.0 - 36.5 g/dL  
 RDW 16.5 (H) 11.5 - 14.5 % PLATELET 92 (L) 866 - 400 K/uL MPV 11.3 8.9 - 12.9 FL  
 NRBC 1.1 (H) 0  WBC ABSOLUTE NRBC 0.03 (H) 0.00 - 0.01 K/uL NEUTROPHILS 61 32 - 75 % BAND NEUTROPHILS 4 % LYMPHOCYTES 17 12 - 49 % MONOCYTES 15 (H) 5 - 13 % EOSINOPHILS 3 0 - 7 % BASOPHILS 0 0 - 1 % IMMATURE GRANULOCYTES 0 0.0 - 0.5 % ABS. NEUTROPHILS 1.7 (L) 1.8 - 8.0 K/UL  
 ABS. LYMPHOCYTES 0.5 (L) 0.8 - 3.5 K/UL  
 ABS. MONOCYTES 0.4 0.0 - 1.0 K/UL  
 ABS. EOSINOPHILS 0.1 0.0 - 0.4 K/UL  
 ABS. BASOPHILS 0.0 0.0 - 0.1 K/UL  
 ABS. IMM. GRANS. 0.0 0.00 - 0.04 K/UL  
 DF MANUAL    
 RBC COMMENTS MACROCYTOSIS 1+ 
    
 RBC COMMENTS ANISOCYTOSIS 
PRESENT 
    
 RBC COMMENTS EDELMIRA CELLS 
PRESENT 
    
METABOLIC PANEL, COMPREHENSIVE Collection Time: 09/18/18  1:48 PM  
Result Value Ref Range Sodium 139 136 - 145 mmol/L Potassium 2.7 (LL) 3.5 - 5.1 mmol/L Chloride 101 97 - 108 mmol/L  
 CO2 24 21 - 32 mmol/L Anion gap 14 5 - 15 mmol/L Glucose 180 (H) 65 - 100 mg/dL BUN 64 (H) 6 - 20 MG/DL Creatinine 2.43 (H) 0.70 - 1.30 MG/DL  
 BUN/Creatinine ratio 26 (H) 12 - 20 GFR est AA 32 (L) >60 ml/min/1.73m2 GFR est non-AA 26 (L) >60 ml/min/1.73m2 Calcium 7.5 (L) 8.5 - 10.1 MG/DL Bilirubin, total 1.4 (H) 0.2 - 1.0 MG/DL  
 ALT (SGPT) 23 12 - 78 U/L  
 AST (SGOT) 19 15 - 37 U/L Alk. phosphatase 93 45 - 117 U/L Protein, total 6.3 (L) 6.4 - 8.2 g/dL Albumin 3.5 3.5 - 5.0 g/dL Globulin 2.8 2.0 - 4.0 g/dL A-G Ratio 1.3 1.1 - 2.2 CK W/ CKMB & INDEX Collection Time: 09/18/18  1:48 PM  
Result Value Ref Range CK 59 39 - 308 U/L  
 CK - MB 2.0 <3.6 NG/ML  
 CK-MB Index 3.4 (H) 0 - 2.5 MAGNESIUM Collection Time: 09/18/18  1:48 PM  
Result Value Ref Range Magnesium 2.4 1.6 - 2.4 mg/dL TROPONIN I Collection Time: 09/18/18  1:48 PM  
Result Value Ref Range Troponin-I, Qt. <0.05 <0.05 ng/mL POC CHEM8 Collection Time: 09/18/18  1:50 PM  
Result Value Ref Range Calcium, ionized (POC) 1.05 (L) 1.12 - 1.32 mmol/L Sodium (POC) 138 136 - 145 mmol/L Potassium (POC) 2.6 (LL) 3.5 - 5.1 mmol/L  Chloride (POC) 98 98 - 107 mmol/L  
 CO2 (POC) 25 21 - 32 mmol/L Anion gap (POC) 19 10 - 20 mmol/L Glucose (POC) 194 (H) 65 - 100 mg/dL BUN (POC) 58 (H) 9 - 20 mg/dL Creatinine (POC) 2.2 (H) 0.6 - 1.3 mg/dL GFRAA, POC 36 (L) >60 ml/min/1.73m2 GFRNA, POC 30 (L) >60 ml/min/1.73m2 Hematocrit (POC) 41 36.6 - 50.3 % Comment Comment Not Indicated. EKG as read by me shows ST rate 105, left BBB CXR read by radiology and reviewed by myself shows FINDINGS:  
A portable AP radiograph of the chest was obtained at 1400 hours. The 
patient is on a cardiac monitor. There is mild cardiomegaly. There is pulmonary 
vascular congestion and mild to moderate pulmonary edema. No pneumonia. ICD is in place. IMPRESSION: 
1. Pulmonary vessel congestion and mild to moderate pulmonary edema. I saw the patient personally, took a history and did a complete physical exam at the bedside. I performed complex decision making in coming up with a diagnostic and treatment plan for the patient. I reviewed the patient's past medical records, current laboratory and radiology results, and actual Xray films/EKG. I have also discussed this case with the involved ED physician. Care Plan discussed with: 
  Patient, wife and sister, ED Doc Risk of deterioration:  High Total Time Coordinating Admission: 65    minutes   Total Critical Care Time:    
 
 
Alpesh Luna MD

## 2018-09-18 NOTE — PROGRESS NOTES
Spiritual Care Assessment/Progress Note [facility] 
 
 
NAME: Lorraine Lord      MRN: [id number] AGE: 72 y.o. SEX: male Pentecostal Affiliation:   
Language:   
 
9/18/2018     Total Time (in minutes): 20 Spiritual Assessment begun in [facility] EMERGENCY DEPT through conversation with: 
  
    [x]Patient        [] Family    [] Friend(s) Reason for Consult: Crisis Spiritual beliefs: (Please include comment if needed) 
   [] Identifies with a olga tradition:     
   [] Supported by a olga community:        
   [] Claims no spiritual orientation:       
   [] Seeking spiritual identity:            
   [] Adheres to an individual form of spirituality:       
   [x] Not able to assess:                   
 
    
Identified resources for coping:  
   [] Prayer                           
   [] Music                  [] Guided Imagery 
   [] Family/friends                 [] Pet visits [] Devotional reading                         [x] Unknown 
   [] Other:                                         
 
 
Interventions offered during this visit: (See comments for more details) Patient Interventions: Crisis, Initial visit Plan of Care: 
 
 [x] Support spiritual and/or cultural needs  
 [] Support AMD and/or advance care planning process    
 [] Support grieving process 
 [] Coordinate Rites and/or Rituals  
 [] Coordination with community clergy [] No spiritual needs identified at this time 
 [] Detailed Plan of Care below (See Comments)  [] Make referral to Music Therapy 
[] Make referral to Pet Therapy    
[] Make referral to Addiction services 
[] Make referral to [facility] 
[] Make referral to Spiritual Care Partner 
[] No future visits requested       
[x] Follow up visits as needed Comments:  Responded to page for Code [facility] [place] in ER. When I arrived in ER, staff was waiting for EMS.   Patient had been resuscitated on arrival. Family present in the home, but not in the ER at this time. Patient is being evaluated and cared for by ER staff. Chaplains available as needed. CAMILO Miller, [facility]  Paging Service  [phone]

## 2018-09-18 NOTE — CONSULTS
Consult NAME: Nino Monge :  1945 MRN:  [id number] Date/Time:  2018 2:08 PM 
 
Patient PCP: No primary care provider on file. 
________________________________________________________________________ Assessment: 1. Brief cardiac arrest w/ CPR 2. Recurrent ventricular tachycardia 3. New onset atrial flutter 4. Hypokalemia 5. Status post ICD implant; device check 18 w/ no events 6. Coronary artery disease s/p LCx PCI '96 
7. 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 Paroxysmal atrial fibrillation 9. Neck cancer s/p XRT 10. Severe mitral regurgitation 11. Hyperlipidemia 12. Chronic obstructive pulmonary disease 13. Tobacco abuse 14. Usual cardiologist:  Dr. Edy Lewis Plan:  
K 2.6 EKG w/ AFL, LBBB, and ischemic ST/T wave changes Device interrogated in the ER; new onset AFutter w/ VT s/p 1 external and 1 internal device therapy. Overdrive pacing attempted but unsuccessful. On BiPAP 1. Continue amiodarone gtt 2. Start lovenox therapeutic dosing 3. Aggressive K repletion. Goal > 4. 
4. Replete magnesium; goal > 2. Give 2g now. 5. If pressors are needed, would use neosynephrine for now. Avoid dopamine/dobutamine w/ VT. 6. Diuresis as tolerated 7. Echo 8. Consider LHC in the coming days 9. Continue beta blocker as BP tolerates; would change to Toprol XL 10. Continue statin 11. Hold aldactone w/ hypotension 12. Dr. Meghann Gonsalves aware of case 13. Continue ASA 81mg Thank you for this consult and allowing me to take part in this patients care. Please call with questions. [x]        High complexity decision making was performed Subjective: CHIEF COMPLAINT: VT 
 
HISTORY OF PRESENT ILLNESS:    
Enedina Mayo is a 72 y.o.  male who \"presents via EMS to the ED with concern for cardiac arrest following a syncopal episode at home. Pt was found with a rhythm and brought to [facility].  During transit he went into a run of V tach which lasted 12 seconds. His internal defibrillator went off returning him to a period of paced rhythm before entering asystole. At this time CPR was administered for 2 minutes after which his pulse returned. Following CPR he was semi-stable. His blood sugar was 237. He has been hypotensive since EMS arrival. Upon ED arrival at 1:31 PM he was given 150 mg of Amiodarone at 1:32 PM. He had a rhythm and was able to respond to basic questions. He was able to confirm his history of CHF and specifically deny recent nausea, vomiting, abd pain or dysuria. \" We were asked to consult for work up and evaluation of the above problems. No past medical history on file. No past surgical history on file. No Known Allergies Meds:  See below Social History Substance Use Topics  Smoking status: Not on file  Smokeless tobacco: Not on file  Alcohol use Not on file No family history on file. REVIEW OF SYSTEMS:   
 []         Unable to obtain  ROS due to --- 
 [x]         Total of 12 systems reviewed as follows: 
 
Constitutional: negative fever, negative chills, negative weight loss Eyes:   negative visual changes ENT:   negative sore throat, tongue or lip swelling Respiratory:  negative cough, negative dyspnea Cards:  negative for chest pain, palpitations, lower extremity edema GI:   negative for nausea, vomiting, diarrhea, and abdominal pain Genitourinary: negative for frequency, dysuria Integument:  negative for rash Hematologic:  negative for easy bruising and gum/nose bleeding Musculoskel: negative for myalgias,  back pain Neurological:  negative for headaches, dizziness, vertigo, weakness Behavl/Psych: negative for feelings of anxiety, depression Pertinent Positives include : 
 
Objective:  
  
Physical Exam: 
 
Last 24hrs VS reviewed since prior progress note. Most recent are: 
 
Visit Vitals  BP 95/53 (BP 1 Location: Right arm, BP Patient Position: At rest)  Pulse (!) 102  Resp 23  Wt 81.4 kg (179 lb 7.3 oz) No intake or output data in the 24 hours ending 09/18/18 1408 General Appearance: Well developed, well nourished, alert & oriented x 2,  
 mild distress. On BiPAP. Looks unwell. Ears/Nose/Mouth/Throat: Pupils equal and round, Hearing grossly normal. 
Neck: Supple. JVP within normal limits. Carotids good upstrokes, with no bruit. Chest: Lungs w/ scattered rhonchi 
Cardiovascular: Irregular rate and rhythm, S1S2 normal, no murmur, rubs, gallops. Abdomen: Soft, non-tender, bowel sounds are active. No organomegaly. Extremities: No edema bilaterally. Femoral pulses +2, Distal Pulses +1. Skin: Warm and dry. Neuro: CN II-XII grossly intact, Strength and sensation grossly intact. []         Post-cath site without hematoma, bruit, tenderness, or thrill. Distal pulses intact. Data:  
  
Telemetry:  AFL 
 
EKG: AFL, LBBB, ischemic ST/T wave changes. []  No new EKG for review. Prior to Admission medications Not on File Recent Results (from the past 24 hour(s)) EKG, 12 LEAD, INITIAL Collection Time: 09/18/18  1:40 PM  
Result Value Ref Range Ventricular Rate 105 BPM  
 Atrial Rate 110 BPM  
 P-R Interval 122 ms QRS Duration 154 ms Q-T Interval 396 ms QTC Calculation (Bezet) 523 ms Calculated P Axis 94 degrees Calculated R Axis 32 degrees Calculated T Axis 123 degrees Diagnosis Sinus tachycardia Possible Left atrial enlargement Left bundle branch block No previous ECGs available BLOOD GAS, ARTERIAL Collection Time: 09/18/18  1:42 PM  
Result Value Ref Range pH 7.30 (L) 7.35 - 7.45    
 PCO2 39 35.0 - 45.0 mmHg PO2 39 (LL) 80 - 100 mmHg O2 SAT 68 (L) 92 - 97 % BICARBONATE 19 (L) 22 - 26 mmol/L  
 BASE DEFICIT 7.2 mmol/L  
 O2 METHOD NASAL O2    
 O2 FLOW RATE 4.00 L/min Sample source ARTERIAL    
 SITE LEFT RADIAL  DAVEY'S TEST YES    
 Critical value read back Krista Spearman MD Lenise Shone III, DO

## 2018-09-18 NOTE — PROGRESS NOTES
1850 Patient arrived via stretcher to CCU 
[address] July Booth. returned page about patient's respiratory status, inability to  pulse ox, vomiting into bipap and refusal to wear bypap. To get stat ABG, get nose clip pulse ox, Cardiac enzymes in 3 hours. 1900 Patient vomited small amount of emesis 1915 Patient pulling off O2 and nasal sensor. Wanting water and soda. 1930 Bedside shift change report given to Nadeem Amaral (oncoming nurse) by Maria Del Rosario Weber (offgoing nurse). Report included the following information SBAR, Kardex, ED Summary, Procedure Summary, Intake/Output, MAR, Accordion, Recent Results, Med Rec Status, Cardiac Rhythm Afib and Alarm Parameters .

## 2018-09-18 NOTE — PROGRESS NOTES
[facility] Pulmonary, Critical Care, and Sleep Medicine Name: Conchetta Mohs MRN: [id number] : 1945 Hospital: [facility] Date: 2018 IMPRESSION:  
· Acute hypoxic respiratory failure · VT and asystole cardiac arrest 
· Mixed ischemic/nonischemic cardiomyopathy - LVEF 20% · Acute pulmonary edema · Recent reduction in cardiac meds due to hypotension · Atrial flutter with h/o paroxysmal afib · CKD · Severe mitral valve regurgitation · H/O neck cancer s/p XRT · Lymphopenia, thrombocytopenia · COPD by history, no details available; can't rule out exacerbation · CAD · Tobacco use PLAN:  
· BiPAP support - recheck ABG and wean off if oxygenation adequate · Diurese · Bronchodilators · Systemic corticosteroids (unsure if wheezing is due to pulmonary edema or COPD) · Pressors · Cardiology evaluation ongoing · Replete electrolytes · Amiodarone · May need heme evaluation if cytopenias persist 
· Monitor creatinine carefully, at risk for failure, currently creatinine is close to baseline, I believe · DVT prophylaxis · Critically ill, high risk for further decompensation Subjective/Interval History:  
I have reviewed the flowsheet and previous days notes. Patient admitted today after cardiac arrest.  Had syncopal episode at home, had VT and was shocked by ICD, subsequent VT led to asystole. Had ROSC and is awake and alert on BiPAP, asking for it to be removed. Denies chest pain or shortness of breath at this time. His BP meds and diuretics were recently reduced due to hypotension Review of Systems Constitutional: Positive for fatigue. HENT: Negative. Eyes: Negative. Respiratory: Negative. Cardiovascular: Negative. Gastrointestinal: Negative. Objective:  
Vital Signs:   
Visit Vitals  BP (!) 83/59  Pulse 92  Temp (!) 93.8 °F (34.3 °C)  Resp 20  
 Ht 6' (1.829 m)  Wt 81.4 kg (179 lb 7.3 oz)  SpO2 97%  BMI 24.34 kg/m2 O2 Device: BIPAP Temp (24hrs), Av.8 °F (34.3 °C), Min:93.8 °F (34.3 °C), Max:93.8 °F (34.3 °C) Intake/Output:  
Last shift:        
Last 3 shifts:   
No intake or output data in the 24 hours ending 18 1720 Physical Exam  
Constitutional: No distress. HENT:  
Head: Normocephalic and atraumatic. Mouth/Throat: No oropharyngeal exudate. Eyes: No scleral icterus. Cardiovascular: Regular rhythm. Tachycardia present. Pulmonary/Chest: No respiratory distress. He has wheezes. He has rales. Abdominal: Soft. Bowel sounds are normal. He exhibits no distension. There is no tenderness. Musculoskeletal: He exhibits no edema. Neurological: He is alert. Skin: Skin is dry. cool Data:  
Labs: 
Recent Labs  
   18 
 1348 WBC  2.7* HGB  12.6 HCT  39.3 PLT  92* Recent Labs  
   18 
 1348 NA  139  
K  2.7*  
CL  101 CO2  24 GLU  180* BUN  64* CREA  2.43* CA  7.5* MG  2.4 ALB  3.5 TBILI  1.4* SGOT  19 ALT  23 INR  1.3* Recent Labs  
   18 
 1342 PH  7.30* PCO2  39 PO2  39* HCO3  19* Imaging: 
I have personally reviewed the patients radiographs: 
Pulmonary edema Total critical care time exclusive of procedures 35 minutes Mode Garcia MD

## 2018-09-18 NOTE — PROGRESS NOTES
Pharmacy Clarification of Prior to Admission Medication Regimen The patient was not interviewed regarding clarification of the prior to admission medication regimen due to AMS. Patient's wife was present in room and stated all the patient's medications come from to [facility]. MHT called [facility], [phone], and spoke with Chaparrita Frederick, who was able to verify the patient's current medications. MHT reinterviewed the patient's wife who was able to verify OTC medications and last doses administered. Information Obtained From: Patient's wife, outpatient pharmacy Recommendations/Findings: The following amendments were made to the [facility] [address] [facility]  
 
1) Additions:  
? All 14 medications below 2) Removals: NONE 3) Changes: NONE 4) Pertinent Pharmacy Findings: 
? Updated patients preferred outpatient pharmacy to: [facility], [address] [facility] medication list was corrected to the following:  
 
Prior to Admission Medications Prescriptions Last Dose Informant Patient Reported? Taking? CYANOCOBALAMIN, VITAMIN B-12, PO 9/18/2018 at Unknown time Significant Other Yes Yes Sig: Take 1 Tab by mouth daily. TURMERIC ROOT EXTRACT PO 9/18/2018 at Unknown time Significant Other Yes Yes Sig: Take 1 Tab by mouth daily. amiodarone (CORDARONE) 200 mg tablet 9/18/2018 at Unknown time Other Yes Yes Sig: Take 200 mg by mouth daily. aspirin delayed-release 81 mg tablet 9/18/2018 at Unknown time Significant Other Yes Yes Sig: Take 81 mg by mouth daily. aspirin/acetaminophen/caffeine (EXCEDRIN EXTRA STRENGTH PO) 9/17/2018 at Unknown time Significant Other Yes Yes Sig: Take 1 Tab by mouth two (2) times daily as needed for Pain. carvedilol (COREG) 25 mg tablet 9/18/2018 at Unknown time Other Yes Yes Sig: Take 12.5 mg by mouth two (2) times daily (with meals). coenzyme q10 (CO Q-10) 10 mg cap 9/18/2018 at Unknown time Significant Other Yes Yes Sig: Take 1 Tab by mouth daily. docosahexanoic acid/epa (FISH OIL PO) 9/18/2018 at Unknown time Significant Other Yes Yes Sig: Take 1 Cap by mouth daily. enalapril (VASOTEC) 5 mg tablet 9/18/2018 at Unknown time Other Yes Yes Sig: Take 2.5 mg by mouth daily. famotidine (PEPCID) 20 mg tablet 9/17/2018 at Unknown time Other Yes Yes Sig: Take 40 mg by mouth nightly. furosemide (LASIX) 40 mg tablet 9/18/2018 at Unknown time Other Yes Yes Sig: Take 40 mg by mouth daily. levothyroxine (SYNTHROID) 125 mcg tablet 9/18/2018 at Unknown time Other Yes Yes Sig: Take 125 mcg by mouth Daily (before breakfast). pantoprazole (PROTONIX) 40 mg tablet 9/18/2018 at Unknown time Other Yes Yes Sig: Take 40 mg by mouth daily. prasterone, DHEA, (DHEA PO) 9/18/2018 at Unknown time Significant Other Yes Yes Sig: Take 1 Tab by mouth daily. Facility-Administered Medications: None Thank you, 
Rell Tolbert CPhT Medication History Pharmacy Technician

## 2018-09-18 NOTE — ED NOTES
Assumed care from EMS. Patient comes to the ED after having a syncopal episode this morning at breakfast. The patients wife called EMS, and upon arrival the patient was alert and oriented x4. In route the patient was hypotensive and began to have about 12 second runs of V-Tach and the patients pacemaker fired off. The patient then went into Asystole and EMS began doing chest compressions for about 2 minutes and the patient woke up and began to have seizure like activity that lasted approximately 10 seconds. Patients BS for EMS was 237. The patients received 4 mg of zofran for some nausea and vomiting. Upon arrival the patient was still hypotensive and was grey around the face. Dr. Chaparro Farley at bedside and 150mg of Amiodarone push was administered.

## 2018-09-18 NOTE — ED NOTES
Dr. Danette Kaur informed of patients condition and patients status. Patient does not want to be on BiPap. Patient has been taken off bear hugger and placed on 6 liters of Nasal Cannula.

## 2018-09-18 NOTE — ED NOTES
Upon entering the room, the patient was actively vomiting and patient placed on 5 liters of oxygen NACL. Dr. Bird Sandy aware.

## 2018-09-18 NOTE — ED NOTES
This RN obtained EKG for patient complaining of feeling weird. Patient is no in Atrial Fibriliation with RVR. DR. Omayra Remy Informed.

## 2018-09-18 NOTE — PROGRESS NOTES
Patient ID: 
Patient: Denzel Paiz MRN: [id number] Age: 72 y.o. 
: 1945 Gender: male Device interrogation in ER: The SJM model 5860-44S (dual chamber ICD implanted 10/17/2012) was interrogated revealing adequate battery (1.0-3.2 years), sensing, and lead impedances. The ventricular capture threshold was moderately elevated and atrial capture could not be tested due to ongoing atrial flutter. RA Aflutter 2.4, -, 380 RV 10.6, 5.575x0.8 (increased from 4.5x0.8 on ), 340, 70 Programmed at [address] 50, there is 19% AP and 5%  since last check . The underlying rhythm is atrial flutter. Of note on  at 1:25 pm, while he was in atrial flutter with controlled ventricular response, he had sudden onset of monomorphic VT at a rate of 184 bpm which fell in to the device \"VF\" zone. ATP x 1 was tried during charging and was unsuccessful, so a prompt 20J shock was given, 3.9 sec charge time, 69 ohm shock impedance. The shock did not fully terminate VT, a slower monomorphic VT that fell in the VT monitor only zone continued. An external shock was successful at terminating this and coincidentally atrial flutter. Later,  at 1:29 pm, atrial pacing into the atrial refractory period resulted in recurrent atrial flutter. With multiple attempts, I still was unable to pace-terminated the atrial flutter in the ER. Impression: 1. Unsuccessful shock therapy for VT with a subsequent slower untreated/monitored VT, leading to an external shock rescue. Ideally, the VT is terminated. 2.  Also, the ventricular capture threshold is moderately-elevated. With the history of asystole earlier today, would have to assume ventricular noncapture. Recommendations and Programming changes: 1. No programming changes were made in the ER. 2.  Severe hypokalemia will be treated. 3.  On amiodarone, for VT and potentially atrial flutter suppression. Would likely need a cardioversion for the latter if appropriate. 4.  Would use max ventricular output to optimize ventricular capture. 5.  Will consider changing tachy zones to the following--VT with therapy 150-181, VF zone >182 bpm.  Will see how he does tomorrow and make changes accordingly to his device. Signed By: Shannan Cobos MD   
 September 18, 2018

## 2018-09-18 NOTE — ED NOTES
Patient rectal temperature is below therapeutic range. This RN applied bear [facility].

## 2018-09-18 NOTE — ED NOTES
Upon coming up to transport patient, patient vomited again with his Nasal Cannula on. Patient still does not want to be on BiPap.

## 2018-09-18 NOTE — ED PROVIDER NOTES
EMERGENCY DEPARTMENT HISTORY AND PHYSICAL EXAM 
 
 
Date: 9/18/2018 Patient Name: Polly Marquez History of Presenting Illness Chief Complaint Patient presents with  Syncope History Provided By: EMS 
 
HPI: Polly Marquez, 72 y.o. male with PMHx significant for CHF, hypotension and a pacemaker/defibrillator, presents via EMS to the ED with concern for cardiac arrest following a syncopal episode at home. Pt was found with a rhythm and brought to ED [facility]. During transit he went into a run of V tach which lasted 12 seconds. His internal defibrillator went off returning him to a period of paced rhythm before entering asystole. At this time CPR was administered for 2 minutes after which his pulse returned. Following CPR he was semi-stable. His blood sugar was 237. He has been hypotensive since EMS arrival.  
 
 
   HPI and ROS are limited due to unresponsiveness. There are no other complaints, changes, or physical findings at this time. PCP: No primary care provider on file. Cardiology: Dr. Maycol Hoffmann Current Facility-Administered Medications Medication Dose Route Frequency Provider Last Rate Last Dose  amiodarone (CORDARONE) 375 mg/250 mL D5W infusion  1 mg/min IntraVENous CONTINUOUS Mike Forbes DO Followed by  
Margy amiodarone (CORDARONE) 375 mg/250 mL D5W infusion  0.5 mg/min IntraVENous CONTINUOUS Mike Forbes DO      
 [START ON 9/19/2018] aspirin (ASPIRIN) tablet 325 mg  325 mg Oral DAILY Mike Forbes DO Past History Past Medical History: 
Unknown except arrhythmia, recent ARF Past Surgical History: 
Unknown except cardiac device Family History: 
Unknown Social History: 
Social History Substance Use Topics  Smoking status: Current Every Day Smoker Packs/day: 0.50 Years: 30.00  Smokeless tobacco: Never Used  Alcohol use None Allergies: 
No Known Allergies Review of Systems Review of Systems Unable to perform ROS: Patient unresponsive Physical Exam  
Physical Exam  
Constitutional: He appears distressed. Thin male, ashen gray, agonal respirations HENT:  
Head: Normocephalic and atraumatic. Mouth/Throat: Oropharynx is clear and moist. No oropharyngeal exudate. Eyes: Conjunctivae and EOM are normal. Pupils are equal, round, and reactive to light. Sluggish Neck: Normal range of motion. Neck supple. JVD present. No tracheal deviation present. Cardiovascular: No murmur heard. Central pulses intact, peripheral pulses absent Pulmonary/Chest: No stridor. No respiratory distress. He has no wheezes. He has rales. He exhibits no tenderness. Minimal effort, Cardiac device Left upper chest  
Abdominal: Soft. He exhibits mass (Hepatomegaly). He exhibits no distension. There is no tenderness. There is no rebound and no guarding. Musculoskeletal: Normal range of motion. He exhibits no edema or tenderness. Neurological: No cranial nerve deficit. Arouses to verbal stimuli, No gross motor or sensory deficits Skin: Skin is warm and dry. He is not diaphoretic. Psychiatric:  
Unable to assess Nursing note and vitals reviewed. Diagnostic Study Results Labs - Recent Results (from the past 12 hour(s)) EKG, 12 LEAD, INITIAL Collection Time: 09/18/18  1:40 PM  
Result Value Ref Range Ventricular Rate 105 BPM  
 Atrial Rate 110 BPM  
 P-R Interval 122 ms QRS Duration 154 ms Q-T Interval 396 ms QTC Calculation (Bezet) 523 ms Calculated P Axis 94 degrees Calculated R Axis 32 degrees Calculated T Axis 123 degrees Diagnosis Sinus tachycardia Possible Left atrial enlargement Left bundle branch block No previous ECGs available BLOOD GAS, ARTERIAL Collection Time: 09/18/18  1:42 PM  
Result Value Ref Range pH 7.30 (L) 7.35 - 7.45    
 PCO2 39 35.0 - 45.0 mmHg PO2 39 (LL) 80 - 100 mmHg O2 SAT 68 (L) 92 - 97 % BICARBONATE 19 (L) 22 - 26 mmol/L  
 BASE DEFICIT 7.2 mmol/L  
 O2 METHOD NASAL O2    
 O2 FLOW RATE 4.00 L/min Sample source ARTERIAL    
 SITE LEFT RADIAL DAVEY'S TEST YES Critical value read back Brenda Renteria MD   
PROTHROMBIN TIME + INR Collection Time: 09/18/18  1:48 PM  
Result Value Ref Range INR 1.3 (H) 0.9 - 1.1 Prothrombin time 13.0 (H) 9.0 - 11.1 sec PTT Collection Time: 09/18/18  1:48 PM  
Result Value Ref Range aPTT 25.2 22.1 - 32.0 sec  
 aPTT, therapeutic range     58.0 - 77.0 SECS  
POC CHEM8 Collection Time: 09/18/18  1:50 PM  
Result Value Ref Range Calcium, ionized (POC) 1.05 (L) 1.12 - 1.32 mmol/L Sodium (POC) 138 136 - 145 mmol/L Potassium (POC) 2.6 (LL) 3.5 - 5.1 mmol/L Chloride (POC) 98 98 - 107 mmol/L  
 CO2 (POC) 25 21 - 32 mmol/L Anion gap (POC) 19 10 - 20 mmol/L Glucose (POC) 194 (H) 65 - 100 mg/dL BUN (POC) 58 (H) 9 - 20 mg/dL Creatinine (POC) 2.2 (H) 0.6 - 1.3 mg/dL GFRAA, POC 36 (L) >60 ml/min/1.73m2 GFRNA, POC 30 (L) >60 ml/min/1.73m2 Hematocrit (POC) 41 36.6 - 50.3 % Comment Comment Not Indicated. Radiologic Studies -  
XR CHEST PORT Final Result CT Results  (Last 48 hours) None CXR Results  (Last 48 hours) 09/18/18 1411  XR CHEST PORT Final result Impression:  IMPRESSION:  
1. Pulmonary vessel congestion and mild to moderate pulmonary edema. Narrative:  EXAM:  XR CHEST PORT INDICATION:  ROSC,, syncope COMPARISON:  None. FINDINGS: A portable AP radiograph of the chest was obtained at 1400 hours. The  
patient is on a cardiac monitor. There is mild cardiomegaly. There is pulmonary  
vascular congestion and mild to moderate pulmonary edema. No pneumonia. ICD is in place. Medical Decision Making I am the first provider for this patient.  
 
I reviewed the vital signs, available nursing notes, past medical history, past surgical history, family history and social history. Vital Signs-Reviewed the patient's vital signs. Patient Vitals for the past 12 hrs: 
 Pulse Resp BP  
09/18/18 1343 (!) 102 23 95/53 Pulse Oximetry Analysis - 70% on NRB Cardiac Monitor:  
Rate: 102 bpm 
Rhythm: Sinus Tachycardia EKG interpretation: (Preliminary) Afib, aberrant conduction, rate 102, , prolonged qrs, ST depression and T wave inversion in lateral leads Records Reviewed: Nursing Notes and Old Medical Records Provider Notes (Medical Decision Making): DDx: status post cardiac arrest, acute hepatic failure, vasovagal syncope, acute pulmonary edema ED Course:  
Initial assessment performed. The patients presenting problems have been discussed, and they are in agreement with the care plan formulated and outlined with them. I have encouraged them to ask questions as they arise throughout their visit. Upon ED arrival at 1:31 PM he was given 150 mg of Amiodarone at 1:32 PM. He had a rhythm and was able to respond to basic questions. He was able to confirm his history of CHF and specifically deny recent nausea, vomiting, abd pain or dysuria. Pt responsiveness improving, at arrival pt agonal resp as RSI meds being pulled pt mental status improving, decision was made not intubate. Given ROSC, will interrogate [facility] device, consult cardiology. EMS rhythm strip reviewed Vtach confirmed, will order Amio gtt. Pt will be given dose of ASA. ABG shows hypoxia with CXR showing pulmonary edema, pt placed on BiPAP. Consult Note: 
2:15 PM 
Mark Hammans, DO spoke with Dr. Radha Mancilla, Dr. Harinder Watkins Specialty: Cardiology Discussed pt's hx, disposition, and available diagnostic and imaging results. Reviewed care plans. Consultant agrees with plans as outlined. Pt with significant amount of atrial and ventricular ectopy.  . Amiodarone gtt, K+/Ca+ replacement, ASA, Amio gtt, and Kieran for better BP control, Mark Hammans, DO 
 
 
 Procedure Note - Central Line Placement:  
2:28 PM 
Performed by: Erica Rene MD 
Overseen by: Olu Kelly DO Immediately prior to the procedure, the patient was reevaluated and found suitable for the planned procedure and any planned medications. Immediately prior to the procedure a time out was called to verify the correct patient, procedure, equipment, staff, and marking as appropriate. Area was cleansed with Chlorprep and anesthetized with 3mLs of 1% lidocaine. Prepped and draped in sterile fashion. Landmarks identified. triple lumen catheter was inserted into pt's Right, Femoral Vein with ultrasound guidance. Line sutured in place; sterile dressing applied. Position: Trendelenburg Number of attempts: 1 Estimated blood loss: 5 mL The procedure took 1-15 minutes, and pt tolerated well. CRITICAL CARE NOTE : 
1:47 PM 
IMPENDING DETERIORATION -Airway, Respiratory, Cardiovascular, CNS, Metabolic and Renal 
ASSOCIATED RISK FACTORS - Hypotension, Hypoxia, Dysrhythmia, Metabolic changes, Vascular Compromise and CNS Decompensation MANAGEMENT- Bedside Assessment and Supervision of Care INTERPRETATION -  Xrays, Blood Gases, ECG, Blood Pressure and Cardiac Output Measures INTERVENTIONS - hemodynamic mngmt, Metobolic interventions and BiPAP 
CASE REVIEW - Hospitalist, Medical Sub-Specialist, Nursing and Family TREATMENT RESPONSE -Improved PERFORMED BY - Self NOTES   : 
I have spent 120 minutes of critical care time involved in lab review, consultations with specialist, family decision- making, bedside attention and documentation. During this entire length of time I was immediately available to the patient. Vivienne Sanon 
 
3:00 PM- Consult Hospitalist, will see and evaluate pt for admission. Disposition: 
Admit Note: 
 
I personally discussed case with pt's son as  Well as his wife.  Pt had been seen at [address] [place] [address] on Fri for fatigue, it was noted that pt had labs demonstrating acute renal failure, they felt this was due to his diuretic for which instructed to decrease dosing of his meds. This am pt at table, pt had syncopal event. As pt became more alert he also states recent Defib d/c today. Pt is being admitted by Dr. Sushila Ramesh. The results of their tests and reason(s) for their admission have been discussed with pt and/or available family. They convey agreement and understanding for the need to be admitted and for admission diagnosis. PLAN: 
1. Pt admitted Diagnosis Clinical Impression: S/p cardiac arrest, Ventricular tachycardia, Acute resp failure, pulmonary edema, cardiogenic shock, hypokalemia, hypocalcemia Attestations: This note is prepared by Noni Ewing, acting as Scribe for Juan Osullivan DO: The scribe's documentation has been prepared under my direction and personally reviewed by me in its entirety. I confirm that the note above accurately reflects all work, treatment, procedures, and medical decision making performed by me.

## 2018-09-19 NOTE — PROGRESS NOTES
responded to Death of  Simeon Farnsworth, who was a 72 y.o.,male, Responded to Code Blue. Patient was pronounced at 11:40 am. Patient's family was not present. Medical staff spoke with patient's spouse by phone to advise her. Offered my support on behalf of the patient's family should they arrive. I will continue to follow and will provide pastoral care on an as needed/requested basis and grief support for the family. 601 Benjamin Millan EdD, MDiv For [facility] Page [phone])

## 2018-09-19 NOTE — PROGRESS NOTES
Intubation Note Called to bedside secondary to  respiratory failure. Patient pre-oxygenated with 100% oxygen. No meds DVL x 1 
 
7.5 ETT taped and secured at 22 cm at the teeth. 
 
+ Bilateral BS, + Chest rise, + ETCO2 CXR pending.  
 
Care turned over to covering Attending MD.

## 2018-09-19 NOTE — PROGRESS NOTES
Cardiology Progress Note 9/19/2018 9:00 AM 
 
Admit Date: 9/18/2018 Subjective: Events noted. Remains on phenyleprine with marginal urine output. No further VT Visit Vitals  BP (!) 83/64  Pulse 89  Temp 97.7 °F (36.5 °C)  Resp 19  
 Ht 6' (1.829 m)  Wt 81.4 kg (179 lb 7.3 oz)  SpO2 98%  BMI 24.34 kg/m2  
 
09/17 1901 - 09/19 0700 In: 1152.3 [I.V.:1152.3] Out: 275 [Urine:275] Objective:  
  
Physical Exam: 
VS as above Neck veins distended Chest scattered crackles Card Irreg no gallop heard 3/6 HSM Extrem s edema Neuro alert and awake Data Review:  
Labs:   
7.36/31/82 Mag 2.4 Creat 3.2 K 4.2 Mag 2.4 CXR mild to mod pulm edema Telemetry: maryuri / Deep [place] Assessment: 1. Brief cardiac arrest w/ CPR 2. Recurrent ventricular tachycardia 3. New onset atrial flutter 4. Hypokalemia 5. Status post ICD implant; device check 8/13/18 w/ no events 6. Coronary artery disease s/p LCx PCI '96 
7. 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 Paroxysmal atrial fibrillation 9. Neck cancer s/p XRT 10. Severe mitral regurgitation 11. Hyperlipidemia 12. Chronic obstructive pulmonary disease 13. Tobacco abuse 14. CKD Acute kidney injury prior craet 1.8 15. Cardiogenic shock Plan: Prognosis poor. Try and wean pressors, cont IV diuretics. Consider Lasix drip if renal fxn doesn't improve. Cont IV amiodarone

## 2018-09-19 NOTE — PROGRESS NOTES
1910- bedside report received from [facility] and assumed care of pt. 
 
1930- 2000- pt noted to have low BP ( mean >65) on Kieran gtt. ttr gtt to manage BP (SBP>90)  Oxygen sats low 85-89 by pulse ox and checked at several sites. Potassium IVPB infusing now. Amiodarone gtt at 1mg/min for AFIB currently in 110's range. Lasix ordered in ER given now - full of rales Ant and posteriorly half way up. Solumedrol given now . 2036- 2100-ABG's done and poor oxygenation results. Ph=7.37, co2= 29, po2=49, HCO3=16, BE= -7.6  O2 sats =83. Changed pt to high flow nasal cannula 100% now. Pt c/o pain across his chest. States that he  feels it is muscular and occurs  when he pulls up to sit. 2125- Dr Leticia Flynn paged. 2220- prn order for BIPAP rc'd and will be checking BMP now. To call Dr Simi Payne about pt's code. 2 
2245- pt requested and now has a fan for stating  \"I am feeling hot\" 2300- repaged Dr Leeann Murdock (on for Simi Payne) by Maxwell Coreas RN,  BMP and CKMB drawn and sent. Pt voided 100ml. 2315- O2 sats continue to be in 84-87% range. Pt states that he will wear the BIPAP for a few hours. RT notified of this. 0- Dr Simi Payne updated on status, questions about code status, and he is going to come up to bedside to discuss this with the pt. Will look at labs at that time. Amiodarone gtt at 0.5mg/min 
0100- BIPAP 12/5 at 80 % now. O2 sats 80% 
 
0200- PT RESTING WELL on BIPAP! 0530- Pt continues to rest well on BIPAP. Dr Simi Payne did not come to bedside and pt looking better and using BIPAP . Continue to monitor. AM Labs sent, CXR done. Kieran gtt at 90 and MAP at 85-87.   
 
0700- bedside report given to [facility], RN and also mentioned that pt has not voided since the 100ml at 2300.

## 2018-09-19 NOTE — PROGRESS NOTES
200  Dr. Cortez Breath at bedside. During conversation patient stopped talking, became unresponsive,turned blue. Code blue called. See code blue sheet for documentation. 1140 patient pronounce dead by Dr. Yasmin Berrios. Dr. Anish Miller also at bedside. Wife notified by Dr. Anish Miller.

## 2018-09-19 NOTE — PROGRESS NOTES
Received order from Dr. Sidney Sherwood to remove femoral central line and place PICC. aware of elevated BUN/creatinine. Will notify PICC team as instructed.

## 2018-09-19 NOTE — PROGRESS NOTES
[facility], Critical Care, [facility] Sleep Medicine Name: Rocio Ovalle MRN: [id number] : 1945 Hospital: [place] Date: 2018 IMPRESSION:  
· Acute hypoxic respiratory failure · VT and asystole cardiac arrest 
· Mixed ischemic/nonischemic cardiomyopathy - LVEF 20% · Acute pulmonary edema · Recent reduction in cardiac meds due to hypotension · Atrial flutter with h/o paroxysmal afib · CKD · Severe mitral valve regurgitation · H/O neck cancer s/p XRT · Lymphopenia, thrombocytopenia · COPD by history, no details available; can't rule out exacerbation · CAD · Tobacco use PLAN:  
· DC BiPAP · Wean O2 
· Diurese · Bronchodilators · Taper Systemic corticosteroids · Wean Pressors · Amiodarone drip · May need heme evaluation if cytopenias persist 
· Monitor creatinine, worse today · DVT prophylaxis · Critically ill, high risk for further decompensation Subjective/Interval History:  
I have reviewed the flowsheet and previous days notes. Off bipap On O2 No acute events overnight No acute distress Review of Systems Constitutional: Positive for fatigue. HENT: Negative. Eyes: Negative. Respiratory: Negative. Cardiovascular: Negative. Gastrointestinal: Negative. Objective:  
Vital Signs:   
Visit Vitals  BP 91/57  Pulse 87  Temp 97.9 °F (36.6 °C)  Resp 16  
 Ht 6' (1.829 m)  Wt 81.4 kg (179 lb 7.3 oz)  SpO2 100%  BMI 24.34 kg/m2 O2 Device: Hi flow nasal cannula O2 Flow Rate (L/min): 9 l/min Temp (24hrs), Av.4 °F (36.3 °C), Min:93.8 °F (34.3 °C), Max:98.7 °F (37.1 °C) Intake/Output:  
Last shift:        
Last 3 shifts:  1901 -  0700 In: -  
Out: 275 [Urine:275] Intake/Output Summary (Last 24 hours) at 18 0751 Last data filed at 18 2300 Gross per 24 hour Intake                0 ml Output              275 ml Net             -275 ml Physical Exam  
Constitutional: No distress. HENT:  
Head: Normocephalic and atraumatic. Mouth/Throat: No oropharyngeal exudate. Eyes: No scleral icterus. Cardiovascular: Regular rhythm. Tachycardia present. Pulmonary/Chest: No respiratory distress. He has rales. Abdominal: Soft. Bowel sounds are normal. He exhibits no distension. There is no tenderness. Musculoskeletal: He exhibits no edema. Neurological: He is alert. Skin: Skin is dry. cool Data:  
Labs: 
Recent Labs  
   09/19/18 
 0534  09/18/18 
 1348 WBC  15.6*  2.7* HGB  13.0  12.6 HCT  40.4  39.3 PLT  74*  92* Recent Labs  
   09/19/18 
 0534  09/18/18 
 2249  09/18/18 
 1348 NA  137  138  139  
K  4.3  4.1  2.7*  
CL  101  103  101 CO2  21  21  24 GLU  144*  142*  180* BUN  70*  67*  64* CREA  3.24*  2.94*  2.43* CA  8.7  8.5  7.5* MG  2.4   --   2.4 PHOS  4.1   --    --   
ALB  3.4*   --   3.5 TBILI  3.5*   --   1.4* SGOT  PENDING   --   19 ALT  3048*   --   23 INR   --    --   1.3* Recent Labs  
   09/19/18 
 0402  09/18/18 
 2034  09/18/18 
 1720 PH  7.36  7.37  7.39  
PCO2  31*  29*  40  
PO2  82  47*  50* HCO3  17*  16*  24 FIO2  60  44  50 Imaging: 
I have personally reviewed the patients radiographs: CXR: Pulmonary edema Total critical care time exclusive of procedures 35 minutes Bea Auguste MD

## 2018-09-19 NOTE — DISCHARGE SUMMARY
Death Note Admit: 9/18/18 Time of death: 9/19/18 1140am 
 
REason for expiration: 
Refractory ventricular fibrillation End stage cardiomyopathy Cardiogenic shock Other diagnoses: 
 
Recurrent ventricular tachycardia New onset atrial flutter Hypokalemia 
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 
CKD 3 COPD, POA 15 pound unexplained weight loss over several months POA Early satiety POA Intermittent upper quadrant abdominal pain POA Macrocytic anemia POA Pancytopenia with leukopenia and thrombocytopenia POA Hospital Course: see HPI on H&P. Briefly: admitted s/p cardiac arrest, seen and evaluated by cardiology and pulmonary medicine. tte was 15%. While undergoing discussion with pulmonologist the patient had VFIB arrest, unable to have ROSC despite followign full cardiac protocol. TOD 1140am. Dr Ben Holman, his cardiologist, was present. Wife called, grieving appropriately.   
Luke Benítez MD

## 2018-09-19 NOTE — PROGRESS NOTES
Spiritual Care Assessment/Progress Note [facility] 
 
 
NAME: Conchetta Mohs      MRN: [id number] AGE: 72 y.o. SEX: male Spiritism Affiliation: No preference Language: [place] 9/19/2018     Total Time (in minutes): 17 Spiritual Assessment begun in [facility] 2 CRITICAL CARE 1 through conversation with: 
  
    []Patient        [x] Family    [x] Friend(s) Reason for Consult: Palliative Care, Family Care Spiritual beliefs: (Please include comment if needed) [x] Identifies with a olga tradition:     
   [] Supported by a olga community:        
   [] Claims no spiritual orientation:       
   [] Seeking spiritual identity:            
   [] Adheres to an individual form of spirituality:       
   [] Not able to assess:                   
 
    
Identified resources for coping:  
   [x] Prayer                           
   [] Music                  [] Guided Imagery [x] Family/friends                 [] Pet visits [] Devotional reading                         [] Unknown 
   [] Other:                                          
 
 
Interventions offered during this visit: (See comments for more details) Patient Interventions: Crisis, Initial visit Family/Friend(s): Prayer (actual), Affirmation of emotions/emotional suffering, Affirmation of olga, Iconic (affirming the presence of God/Higher Power) Plan of Care: 
 
 [] Support spiritual and/or cultural needs  
 [] Support AMD and/or advance care planning process    
 [] Support grieving process 
 [] Coordinate Rites and/or Rituals  
 [] Coordination with community clergy 
 [x] No spiritual needs identified at this time 
 [] Detailed Plan of Care below (See Comments)  [] Make referral to Music Therapy 
[] Make referral to Pet Therapy    
[] Make referral to Addiction services 
[] Make referral to [facility] 
[] Make referral to Spiritual Care Partner 
[] No future visits requested [x] Follow up visits as needed Comments: Medical staff called spiritual support when the spouse, son, and another relative arrived in the CCU some time after the passing of their love one. The family was found in the room of the . When approached they were responsive to receiving spiritual support. The family requested and accepted prayer.

## 2018-09-19 NOTE — PROGRESS NOTES
PICC (Peripherally Inserted Central Catheter) line insertion  procedure note 1100 : Received PICC order . BUN/Creatinine elevated. Dr Neel Asher aware of there is no renal consult and he stated pt is not a candidate of HD. Pt's femoral line needed to be removed. Procedure explained to patient    along with risks and benefits and  patient    agreed to proceed. Informed consent obtained from patient Patient teaching completed. Timeout completed. Pre-procedure assessment done. Maximum sterile barrier precautions observed throughout procedure. Lidocaine 1%  3.0    ml SQ given prior to cannulation. Cannulated   basilic  vein using ultrasound guidance and modified seldinger technique. Inserted   6  Lao  triple  lumen PICC to   right  arm using [employer] Tip Location system. Blood return verified and flushed with 20 ml normal saline in each port. Sterile dressing applied with Biopatch, StatLock and occlusive dressing as per protocol. Curos caps applied to each port. Patient tolerated procedure well with minimal blood loss. Patient education material provided. PICC procedure performed by  :  Nneka Hayes RN. PICC nurse Assisted by  :  Tristin Saldana RN. PICC nurse Reason for access : MD order / Reliable access /  Hemodynamically unstable / Limited vascular access / Vesicant IV medication administration    / Remove femoral central line Complications related to insertion  : none Total Trimmed Length    42  cm External Length :  0   cm PICC line site arm circumference:     29  cm PICC catheter occupies    19  %  of vein Type of PICC: Bard Power PICC SOLO Ref # :   [id number] Lot # :  [id number] Expiration Date :  2019-10-31 Portable Chest X-Ray ordered. Pt has  A fib  Rhythm / paced rhythm Primary nurse   Xavier Huddleston RN aware not use until  PICC Tip placement  Is confirmed by  Radiologist. 
1120:  PICC line placement : tip of PICC line lies over the  Distal superior vena cava  per Dr Claudetta Cools  radiologist.    Notified to primary nurse  Jed Funez RN that PICC line is in satisfactory position per radiologist and  it   can be used now. Deven Mccurdy RN. BSN. MONICA,CMSRN. Clinician IV .  PICC Nurse, Vascular Access Team

## 2019-01-16 NOTE — ED NOTES
Angelo Rice about patient vomiting into BiPap and Dr. Michelle Sparrow and both said needs BiPap but because patient is refusing will be placed on Nasal Cannula. RN tried to advocate for further interventions for patient. Dr. Michelle Sparrow will be up to see patient. No complaints